# Patient Record
Sex: FEMALE | Race: WHITE | NOT HISPANIC OR LATINO | Employment: OTHER | URBAN - METROPOLITAN AREA
[De-identification: names, ages, dates, MRNs, and addresses within clinical notes are randomized per-mention and may not be internally consistent; named-entity substitution may affect disease eponyms.]

---

## 2017-02-02 ENCOUNTER — OFFICE VISIT (OUTPATIENT)
Dept: GYNECOLOGIC ONCOLOGY | Facility: CLINIC | Age: 67
End: 2017-02-02

## 2017-02-02 VITALS
RESPIRATION RATE: 14 BRPM | OXYGEN SATURATION: 97 % | WEIGHT: 172 LBS | TEMPERATURE: 97.4 F | HEART RATE: 66 BPM | BODY MASS INDEX: 28.62 KG/M2 | DIASTOLIC BLOOD PRESSURE: 71 MMHG | SYSTOLIC BLOOD PRESSURE: 162 MMHG

## 2017-02-02 DIAGNOSIS — N90.4 LICHEN SCLEROSUS OF FEMALE GENITALIA: ICD-10-CM

## 2017-02-02 DIAGNOSIS — C51.9 VULVAR CANCER (HCC): Primary | ICD-10-CM

## 2017-02-02 PROCEDURE — 99213 OFFICE O/P EST LOW 20 MIN: CPT | Performed by: NURSE PRACTITIONER

## 2017-02-02 RX ORDER — CLOBETASOL PROPIONATE 0.5 MG/G
CREAM TOPICAL 2 TIMES DAILY
Qty: 60 G | Refills: 5 | Status: SHIPPED | OUTPATIENT
Start: 2017-02-02

## 2017-02-02 RX ORDER — ATORVASTATIN CALCIUM 20 MG/1
20 TABLET, FILM COATED ORAL NIGHTLY
COMMUNITY
Start: 2016-11-09

## 2017-02-02 RX ORDER — OMEPRAZOLE 20 MG/1
20 CAPSULE, DELAYED RELEASE ORAL DAILY
COMMUNITY
Start: 2016-11-09

## 2017-02-02 NOTE — PROGRESS NOTES
"GYN ONCOLOGY CANCER SURVEILLANCE FOLLOW-UP    Lee Ann Cullen  [id number]  1950    Chief Complaint: Follow-up (spot on right side of vagina)        History of present illness:  Lee Ann Cullen is a 66 y.o. year old female who is here today for ongoing surveillance of Vulvar Cancer, see Cancer History. She also has a history of lichen sclerosus, which is managed with clobetasol. She is in need of medication refills today. She is feeling generally well today, but does report a small \"spot\" to the right side at the opening of the vagina. She reports occasional irritation, but denies itching, bleeding, pigment changes, or growth of the area. It is only occasionally tender to the touch. Otherwise, she denies vaginal bleeding, pelvic pain, or changes in bowel or bladder function.         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 Stage IIIB grade 1 by final pathology, 7/10 lymph nodes positive.      5/16/2014 Imaging    PET/CT negative for distant metastases      6/2/2014 - 7/9/2014 Radiation    Pelvis received 45 Gy in 25 fractions with 6 MV photons utilizing IMRT treatment planning      6/2014 - 7/2014 Chemotherapy    Completed concurrent chemotherapy with Cisplatin for radiosensitivity      7/10/2014 - 7/16/2014 Radiation    Tumor bed was boosted with additional 5.4 Gy in 3 fractions with 6 MV photons, for a total of 50.4 Gy      8/21/2014 Imaging    Post-treatment CT scan negative for disease         Past Medical History   Diagnosis Date   • Bilateral cataracts    • Depression    • GERD (gastroesophageal reflux disease)    • History of basal cell carcinoma      nose   • HTN (hypertension)    • Hypothyroidism    • IC (interstitial cystitis)      managed by Dr. Funes   • Insomnia    • Lichen sclerosus " of female genitalia    • MRSA (methicillin resistant staph aureus) culture positive    • Seasonal allergies    • Thyroid disease    • Vulvar cancer        Past Surgical History   Procedure Laterality Date   • Appendectomy  1992   • Total knee arthroplasty Left 2010   • Cataract extraction Bilateral    • Total abdominal hysterectomy with salpingo oophorectomy     • Vulvectomy Left 04/2014     modified radical vulvectomy with left inguinofemoral LND   • Back surgery  03/2016   • Other surgical history       microendoscopic disectomy   • Cystoscopy bladder hydrodistension       with biopsies   • Laparoscopic cholecystectomy  05/2016       MEDICATIONS: The current medication list was reviewed and reconciled.     Allergies:  is allergic to augmentin [amoxicillin-pot clavulanate]; hydrocodone-acetaminophen; and phenergan [promethazine hcl].    Family History   Problem Relation Age of Onset   • Heart disease Mother    • Diabetes Mother    • Skin cancer Mother    • Heart disease Father      congestive heart failure   • Heart disease Other      5 brothers   • Breast cancer Maternal Aunt 50       Review of Systems   Constitutional: Negative for appetite change, chills, fatigue, fever and unexpected weight change.   Respiratory: Negative for cough, shortness of breath and wheezing.    Cardiovascular: Negative for chest pain, palpitations and leg swelling.   Gastrointestinal: Negative for abdominal distention, abdominal pain, blood in stool, constipation, diarrhea, nausea and vomiting.   Endocrine: Negative.    Genitourinary: Positive for genital sores. Negative for dyspareunia, dysuria, frequency, hematuria, pelvic pain, urgency, vaginal bleeding, vaginal discharge and vaginal pain.   Musculoskeletal: Negative for arthralgias, gait problem and joint swelling.   Neurological: Negative for dizziness, seizures, syncope, weakness, light-headedness, numbness and headaches.   Hematological: Negative for adenopathy.    Psychiatric/Behavioral: Negative.        Physical Exam  General Appearance:  alert, cooperative, no apparent distress and appears stated age   Neurologic/Psychiatric: A&O x 3, gait steady, appropriate affect   HEENT:  Normocephalic, without obvious abnormality, mucous membranes moist   Abdomen:   Soft, non-tender, non-distended and no organomegaly   Lymph nodes: No cervical, supraclavicular, inguinal or axillary adenopathy noted   Extremities: Normal, atraumatic; no clubbing, cyanosis, or edema    Pelvic: External Genitalia  changes consistent with radiaiton and extensive vulvectomy. Chronic erythema unchanged from previous exams, no evidence of lichen sclerosus today and no lesions noted  Vagina  is pale, atrophic.  and consistent with whole pelvic radiation. Single small fluid filled cyst noted to right introitus at area of patient's concern. Area is soft and without masses. Color is consistent with surrounding tissue.  Vaginal Cuff  Female Vaginal Cuff: smooth, intact and without visible lesions  Uterus  surgically absent and no palpable masses  Ovaries  surgically absent bilaterallly and without palpable masses or fullness  Parametria  smooth  Rectovaginal  Female rectovaginal: deferred     ECOG Performance Status: 0 - Asymptomatic    Procedure Note:  No notes on file      Assessment and Plan:  Lee Ann was seen today for follow-up.    Diagnoses and all orders for this visit:    Vulvar cancer    Lichen sclerosus of female genitalia  -     clobetasol (TEMOVATE) 0.05 % cream; Apply  topically 2 (Two) Times a Day.        Lee Ann and I discussed the spot she has noticed seems to be a very simple small fluid filled cyst. This does not appear to be concerning at this time, however, we will observe this area closely. If this area changes, persists, enlarges, bleeds, or any other new lesions arise, we will obtain a biopsy. She v/u and will call with any concerns.     Return in about 6 months (around 8/2/2017) for ongoing  cancer surveillance.      JOHNNY Rodriguez        Note: Speech recognition transcription software was used to dictate portions of this document.  An attempt at proofreading has been made though minor errors in transcription may still be present.  Please do not hesitate to call our office with any questions.

## 2017-08-07 ENCOUNTER — OFFICE VISIT (OUTPATIENT)
Dept: GYNECOLOGIC ONCOLOGY | Facility: CLINIC | Age: 67
End: 2017-08-07

## 2017-08-07 VITALS
HEIGHT: 65 IN | DIASTOLIC BLOOD PRESSURE: 68 MMHG | BODY MASS INDEX: 28.32 KG/M2 | RESPIRATION RATE: 18 BRPM | HEART RATE: 61 BPM | WEIGHT: 170 LBS | OXYGEN SATURATION: 96 % | SYSTOLIC BLOOD PRESSURE: 149 MMHG | TEMPERATURE: 97.5 F

## 2017-08-07 DIAGNOSIS — C51.9 VULVAR CANCER (HCC): Primary | ICD-10-CM

## 2017-08-07 DIAGNOSIS — L98.9 SKIN LESION OF LEFT LOWER LIMB: ICD-10-CM

## 2017-08-07 PROCEDURE — 99213 OFFICE O/P EST LOW 20 MIN: CPT | Performed by: NURSE PRACTITIONER

## 2017-08-07 NOTE — PROGRESS NOTES
GYN ONCOLOGY CANCER SURVEILLANCE FOLLOW-UP    Lee Ann Cullen  [id number]  1950    Chief Complaint: Follow-up (vulvar cancer)        History of present illness:  Lee Ann Cullen is a 66 y.o. year old female who is here today for ongoing surveillance of Vulvar Cancer, see Cancer History. She is continuing to manage her lichen sclerosus with clobetasol without any problems.  She states she is feeling generally well today and denies any new vulvar lesions.  Her only concern today is of a new small skin lesion to upper left thigh.  She does not currently have a dermatologist and does not desire to return to her previous dermatologist in [place].  She states this spot has been there for a few weeks, is red but not bleeding and appears to be a cluster of vessels at the surface.  This spot and does not itch and has not grown in size since it appeared.  She has been applying Aquaphor topically with no change.  Otherwise, she denies vaginal bleeding, pelvic pain, or changes in bowel or bladder function.     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 Stage IIIB grade 1 by final pathology, 7/10 lymph nodes positive.         5/16/2014 Imaging     PET/CT negative for distant metastases         6/2/2014 - 7/9/2014 Radiation     Pelvis received 45 Gy in 25 fractions with 6 MV photons utilizing IMRT treatment planning         6/2014 - 7/2014 Chemotherapy     Completed concurrent chemotherapy with Cisplatin for radiosensitivity         7/10/2014 - 7/16/2014 Radiation     Tumor bed was boosted with additional 5.4 Gy in 3 fractions with 6 MV photons, for a total of 50.4 Gy         8/21/2014 Imaging     Post-treatment CT scan negative for disease            Past Medical History:   Diagnosis Date   • Bilateral  cataracts    • Depression    • GERD (gastroesophageal reflux disease)    • History of basal cell carcinoma     nose   • HTN (hypertension)    • Hypothyroidism    • IC (interstitial cystitis)     managed by Dr. Funes   • Insomnia    • Lichen sclerosus of female genitalia    • MRSA (methicillin resistant staph aureus) culture positive    • Seasonal allergies    • Thyroid disease    • Vulvar cancer        Past Surgical History:   Procedure Laterality Date   • APPENDECTOMY  1992   • BACK SURGERY  03/2016   • CATARACT EXTRACTION Bilateral    • CYSTOSCOPY BLADDER HYDRODISTENSION      with biopsies   • LAPAROSCOPIC CHOLECYSTECTOMY  05/2016   • OTHER SURGICAL HISTORY      microendoscopic disectomy   • TOTAL ABDOMINAL HYSTERECTOMY WITH SALPINGO OOPHORECTOMY     • TOTAL KNEE ARTHROPLASTY Left 2010   • VULVECTOMY Left 04/2014    modified radical vulvectomy with left inguinofemoral LND       MEDICATIONS: The current medication list was reviewed and reconciled.     Allergies:  is allergic to phenergan [promethazine hcl]; augmentin [amoxicillin-pot clavulanate]; and hydrocodone-acetaminophen.    Family History   Problem Relation Age of Onset   • Heart disease Mother    • Diabetes Mother    • Skin cancer Mother    • Heart disease Father      congestive heart failure   • Heart disease Other      5 brothers   • Breast cancer Maternal Aunt 50       Review of Systems   Constitutional: Negative for appetite change, chills, fatigue, fever and unexpected weight change.   Respiratory: Negative for cough, shortness of breath and wheezing.    Cardiovascular: Negative for chest pain, palpitations and leg swelling.   Gastrointestinal: Negative for abdominal distention, abdominal pain, blood in stool, constipation, diarrhea, nausea and vomiting.   Endocrine: Negative.    Genitourinary: Negative for dyspareunia, dysuria, frequency, genital sores, hematuria, pelvic pain, urgency, vaginal bleeding, vaginal discharge and vaginal pain.  "  Musculoskeletal: Negative for arthralgias, gait problem and joint swelling.   Skin: Positive for color change (red lesion to upper right thigh).   Neurological: Negative for dizziness, seizures, syncope, weakness, light-headedness, numbness and headaches.   Hematological: Negative for adenopathy.   Psychiatric/Behavioral: Negative.        Physical Exam  Vital Signs: /68  Pulse 61  Temp 97.5 °F (36.4 °C)  Resp 18  Ht 65\" (165.1 cm)  Wt 170 lb (77.1 kg)  SpO2 96%  BMI 28.29 kg/m2   General Appearance:  alert, cooperative, no apparent distress and appears stated age   Neurologic/Psychiatric: A&O x 3, gait steady, appropriate affect   HEENT:  Normocephalic, without obvious abnormality, mucous membranes moist   Abdomen:   Soft, non-tender, non-distended and no organomegaly   Lymph nodes: No cervical, supraclavicular, inguinal or axillary adenopathy noted   Extremities: Normal, atraumatic; no clubbing, cyanosis, or edema. Small reddened lesion to upper LLE, appears vascular and not dysplastic in nature    Pelvic: External Genitalia  Changes consistent with radiation and extensive vulvectomy.  Chronic erythema unchanged from previous exams, no evidence of active lichen sclerosus or new concerning lesions.  Vagina  is pale, atrophic.  and changes consistent with previous radiation.  Vaginal Cuff  Female Vaginal Cuff: smooth, intact and without visible lesions. Pap obtained.   Uterus  surgically absent and no palpable masses  Ovaries  surgically absent bilaterallly  Parametria  smooth  Rectovaginal  Female rectovaginal: confirms no masses or bleeding and Hemoccult negative     ECOG Performance Status: 0 - Asymptomatic    Procedure Note:  No notes on file      Assessment and Plan:  Lee Ann was seen today for follow-up.    Diagnoses and all orders for this visit:    Vulvar cancer  -     Pap IG, Rfx HPV ASCU; Future    Skin lesion of left lower limb        Lee Ann and I discussed there is no evidence of disease upon " vulvar or pelvic exam today.  The lesion to her upper left thigh is most consistent with telangiectasia and does not appear obviously concerning. However, if lesion persists, grows, or changes in color or nature, I am happy to perform a biopsy in office for her or refer her to a new dermatologist for evaluation. She v/u and will notify me of any changes or if lesion persists.   Otherwise, we will continue her regular schedule for ongoing cancer surveillance every 6 months.     Return in about 6 months (around 2/7/2018) for ongoing cancer surveillance.      Desiree Mcallister, JOHNNY        Note: Speech recognition transcription software was used to dictate portions of this document.  An attempt at proofreading has been made though minor errors in transcription may still be present.  Please do not hesitate to call our office with any questions.

## 2018-02-08 ENCOUNTER — OFFICE VISIT (OUTPATIENT)
Dept: GYNECOLOGIC ONCOLOGY | Facility: CLINIC | Age: 68
End: 2018-02-08

## 2018-02-08 VITALS
DIASTOLIC BLOOD PRESSURE: 74 MMHG | SYSTOLIC BLOOD PRESSURE: 164 MMHG | WEIGHT: 168 LBS | BODY MASS INDEX: 27.96 KG/M2 | OXYGEN SATURATION: 97 % | RESPIRATION RATE: 14 BRPM | HEART RATE: 74 BPM | TEMPERATURE: 97.2 F

## 2018-02-08 DIAGNOSIS — C51.9 VULVAR CANCER (HCC): Primary | ICD-10-CM

## 2018-02-08 PROCEDURE — 99213 OFFICE O/P EST LOW 20 MIN: CPT | Performed by: NURSE PRACTITIONER

## 2018-02-08 NOTE — PROGRESS NOTES
GYN ONCOLOGY CANCER SURVEILLANCE FOLLOW-UP    Lee Ann Cullen  [id number]  1950    Chief Complaint: Follow-up (no complaints)        History of present illness:  Lee Ann Cullen is a 67 y.o. year old female who is here today for ongoing surveillance of Vulvar Cancer, see Cancer History. She reports she is feeling very well today and has no complaints. She denies vaginal bleeding, pelvic pain, new lesions, and changes in bowel or bladder function. Her last pap smear was in 11/2017 performed by Dr. Jha. She is continuing to see him and us both every 3 months in alternating fashion. She will be approaching 4 years from completion of treatment later this year.  She is scheduled to see Dr. Jha again in 5/2018.           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 Stage IIIB grade 1 by final pathology, 7/10 lymph nodes positive.         5/16/2014 Imaging     PET/CT negative for distant metastases         6/2/2014 - 7/9/2014 Radiation     Pelvis received 45 Gy in 25 fractions with 6 MV photons utilizing IMRT treatment planning         6/2014 - 7/2014 Chemotherapy     Completed concurrent chemotherapy with Cisplatin for radiosensitivity         7/10/2014 - 7/16/2014 Radiation     Tumor bed was boosted with additional 5.4 Gy in 3 fractions with 6 MV photons, for a total of 50.4 Gy         8/21/2014 Imaging     Post-treatment CT scan negative for disease            Past Medical History:   Diagnosis Date   • Bilateral cataracts    • Depression    • GERD (gastroesophageal reflux disease)    • History of basal cell carcinoma     nose   • HTN (hypertension)    • Hypothyroidism    • IC (interstitial cystitis)     managed by Dr. Funes   • Insomnia    • Lichen sclerosus of female genitalia    • MRSA  (methicillin resistant staph aureus) culture positive    • Seasonal allergies    • Thyroid disease    • Vulvar cancer        Past Surgical History:   Procedure Laterality Date   • APPENDECTOMY  1992   • BACK SURGERY  03/2016   • CATARACT EXTRACTION Bilateral    • CYSTOSCOPY BLADDER HYDRODISTENSION      with biopsies   • LAPAROSCOPIC CHOLECYSTECTOMY  05/2016   • OTHER SURGICAL HISTORY      microendoscopic disectomy   • TOTAL ABDOMINAL HYSTERECTOMY WITH SALPINGO OOPHORECTOMY     • TOTAL KNEE ARTHROPLASTY Left 2010   • VULVECTOMY Left 04/2014    modified radical vulvectomy with left inguinofemoral LND       MEDICATIONS: The current medication list was reviewed and reconciled.     Allergies:  is allergic to phenergan [promethazine hcl]; augmentin [amoxicillin-pot clavulanate]; and hydrocodone-acetaminophen.    Family History   Problem Relation Age of Onset   • Heart disease Mother    • Diabetes Mother    • Skin cancer Mother    • Heart disease Father      congestive heart failure   • Heart disease Other      5 brothers   • Breast cancer Maternal Aunt 50       Review of Systems   Constitutional: Negative for appetite change, chills, fatigue, fever and unexpected weight change.   Respiratory: Negative for cough, shortness of breath and wheezing.    Cardiovascular: Negative for chest pain, palpitations and leg swelling.   Gastrointestinal: Negative for abdominal distention, abdominal pain, blood in stool, constipation, diarrhea, nausea and vomiting.   Endocrine: Negative.    Genitourinary: Negative for dyspareunia, dysuria, frequency, genital sores, hematuria, pelvic pain, urgency, vaginal bleeding, vaginal discharge and vaginal pain.   Musculoskeletal: Negative for arthralgias, gait problem and joint swelling.   Neurological: Negative for dizziness, seizures, syncope, weakness, light-headedness, numbness and headaches.   Hematological: Negative for adenopathy.   Psychiatric/Behavioral: Negative.        Physical  Exam  Vital Signs: /74  Pulse 74  Temp 97.2 °F (36.2 °C) (Temporal Artery )   Resp 14  Wt 76.2 kg (168 lb)  SpO2 97%  BMI 27.96 kg/m2   General Appearance:  alert, cooperative, no apparent distress and appears stated age   Neurologic/Psychiatric: A&O x 3, gait steady, appropriate affect   HEENT:  Normocephalic, without obvious abnormality, mucous membranes moist   Neck: Supple, symmetrical, trachea midline, no adenopathy;  No thyromegaly, masses, or tenderness   Back:   Symmetric, no curvature, ROM normal, no CVA tenderness   Lungs:   Clear to auscultation bilaterally; respirations regular, even, and unlabored bilaterally   Heart:  Regular rate and rhythm, no murmurs appreciated   Breasts:  deferred   Abdomen:   Soft, non-tender, non-distended and no organomegaly   Lymph nodes: No cervical, supraclavicular, inguinal or axillary adenopathy noted   Extremities: Normal, atraumatic; no clubbing, cyanosis, or edema    Pelvic: External Genitalia  Changes consistent with radiation and extensive vulvectomy. Chronic erythema unchanged. Lichen sclerosus well controlled, no new lesions.   Vagina  is pale, atrophic.  and changes consistent with previous radiation.  Vaginal Cuff  Female Vaginal Cuff: smooth, intact and without visible lesions  Uterus  surgically absent and no palpable masses  Ovaries  surgically absent bilaterallly  Parametria  smooth  Rectovaginal  Female rectovaginal: deferred     ECOG Performance Status: 0 - Asymptomatic    Procedure Note:  No notes on file      Assessment and Plan:  Lee Ann was seen today for follow-up.    Diagnoses and all orders for this visit:    Vulvar cancer        There is no evidence of disease upon today's exam. She will be approaching 4 years from completion of treatment later this year and is doing very well. She is encouraged to keep her well woman exams with Dr. Jha and may now go to every 6 month visits, continuing to alternate between our 2 offices. She is  understanding to call with any changes in pelvic symptoms or general GYN concerns.       Return in about 9 months (around 11/8/2018).      JOHNNY Rodriguez        Note: Speech recognition transcription software was used to dictate portions of this document.  An attempt at proofreading has been made though minor errors in transcription may still be present.  Please do not hesitate to call our office with any questions.

## 2018-09-06 ENCOUNTER — TELEPHONE (OUTPATIENT)
Dept: GYNECOLOGIC ONCOLOGY | Facility: CLINIC | Age: 68
End: 2018-09-06

## 2018-09-06 DIAGNOSIS — Z12.11 SCREENING FOR COLON CANCER: Primary | ICD-10-CM

## 2018-09-06 NOTE — TELEPHONE ENCOUNTER
----- Message from Doreen ROSA ISELA Myers sent at 9/6/2018 11:25 AM EDT -----  Regarding: [facility] REFERRAL FOR A COLONOSCOPY  Contact: [phone]  Patient would like a referral for a colonoscopy and wants it scheduled here in [place] would like it to be 9/19/18, 9/25/18, 9/28/19 if possible early morning.  Order in for pt to be referred to Dr. Oglesby for colonoscopy for screening, phoned pt, informed her someone would be calling to schedule, pt v/u.

## 2018-10-02 DIAGNOSIS — Z12.11 SCREENING FOR COLON CANCER: Primary | ICD-10-CM

## 2018-11-08 ENCOUNTER — OFFICE VISIT (OUTPATIENT)
Dept: GYNECOLOGIC ONCOLOGY | Facility: CLINIC | Age: 68
End: 2018-11-08

## 2018-11-08 VITALS
OXYGEN SATURATION: 94 % | WEIGHT: 157 LBS | RESPIRATION RATE: 12 BRPM | HEART RATE: 73 BPM | TEMPERATURE: 98 F | SYSTOLIC BLOOD PRESSURE: 152 MMHG | BODY MASS INDEX: 26.13 KG/M2 | DIASTOLIC BLOOD PRESSURE: 70 MMHG

## 2018-11-08 DIAGNOSIS — C51.9 VULVAR CANCER (HCC): Primary | ICD-10-CM

## 2018-11-08 DIAGNOSIS — L81.9 DISCOLORATION OF SKIN: ICD-10-CM

## 2018-11-08 DIAGNOSIS — K59.00 CONSTIPATION, UNSPECIFIED CONSTIPATION TYPE: ICD-10-CM

## 2018-11-08 PROCEDURE — 99213 OFFICE O/P EST LOW 20 MIN: CPT | Performed by: NURSE PRACTITIONER

## 2018-11-08 RX ORDER — TRIAMCINOLONE ACETONIDE 1 MG/G
CREAM TOPICAL
COMMUNITY
Start: 2018-10-18 | End: 2019-01-10

## 2018-11-08 RX ORDER — NYSTATIN 100000 U/G
CREAM TOPICAL
COMMUNITY
Start: 2018-10-18 | End: 2019-01-10

## 2018-11-08 NOTE — PROGRESS NOTES
GYN ONCOLOGY CANCER SURVEILLANCE FOLLOW-UP    Lee Ann Cullen  [id number]  1950    Chief Complaint: Follow-up (discoloration in the groin)        History of present illness:  Lee Ann Cullen is a 68 y.o. year old female who is here today for ongoing surveillance of Vulvar Cancer, see Cancer History. She has been alternating her visits between our office and Dr. Funes, scheduled for annual exam there in December. Upon arrival today she reports discoloration, similar to bruising, throughout the external genitalia and bilateral inner thighs, worse to the left side. This started approx 1 month ago following a UTI and acute severe constipation. There has been no pain or external bleeding. It is improving every day and is almost resolved.   Otherwise, she is feeling generally well today. She denies vaginal bleeding, pelvic pain, concerning lesions. She reports her previous UTI was treated and resolved. She is staying on a daily bowel regimen and constipation is improved. She is scheduled for colonoscopy in December.         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 Stage IIIB grade 1 by final pathology, 7/10 lymph nodes positive.         5/16/2014 Imaging     PET/CT negative for distant metastases         6/2/2014 - 7/9/2014 Radiation     Pelvis received 45 Gy in 25 fractions with 6 MV photons utilizing IMRT treatment planning         6/2014 - 7/2014 Chemotherapy     Completed concurrent chemotherapy with Cisplatin for radiosensitivity         7/10/2014 - 7/16/2014 Radiation     Tumor bed was boosted with additional 5.4 Gy in 3 fractions with 6 MV photons, for a total of 50.4 Gy         8/21/2014 Imaging     Post-treatment CT scan negative for disease            Past Medical History:    Diagnosis Date   • Bilateral cataracts    • Depression    • GERD (gastroesophageal reflux disease)    • History of basal cell carcinoma     nose   • HTN (hypertension)    • Hypothyroidism    • IC (interstitial cystitis)     managed by Dr. Funes   • Insomnia    • Lichen sclerosus of female genitalia    • MRSA (methicillin resistant staph aureus) culture positive    • Seasonal allergies    • Thyroid disease    • Vulvar cancer (CMS/HCC)        Past Surgical History:   Procedure Laterality Date   • APPENDECTOMY  1992   • BACK SURGERY  03/2016   • CATARACT EXTRACTION Bilateral    • CYSTOSCOPY BLADDER HYDRODISTENSION      with biopsies   • LAPAROSCOPIC CHOLECYSTECTOMY  05/2016   • OTHER SURGICAL HISTORY      microendoscopic disectomy   • TOTAL ABDOMINAL HYSTERECTOMY WITH SALPINGO OOPHORECTOMY     • TOTAL KNEE ARTHROPLASTY Left 2010   • VULVECTOMY Left 04/2014    modified radical vulvectomy with left inguinofemoral LND       MEDICATIONS: The current medication list was reviewed and reconciled.     Allergies:  is allergic to phenergan [promethazine hcl]; augmentin [amoxicillin-pot clavulanate]; and hydrocodone-acetaminophen.    Family History   Problem Relation Age of Onset   • Heart disease Mother    • Diabetes Mother    • Skin cancer Mother    • Heart disease Father         congestive heart failure   • Heart disease Other         5 brothers   • Breast cancer Maternal Aunt 50       Review of Systems   Constitutional: Negative for appetite change, chills, fatigue, fever and unexpected weight change.   Respiratory: Negative for cough, shortness of breath and wheezing.    Cardiovascular: Negative for chest pain, palpitations and leg swelling.   Gastrointestinal: Negative for abdominal distention, abdominal pain, blood in stool, constipation, diarrhea, nausea and vomiting.   Endocrine: Negative.    Genitourinary: Negative for dyspareunia, dysuria, frequency, genital sores, hematuria, pelvic pain, urgency, vaginal bleeding,  vaginal discharge and vaginal pain.   Musculoskeletal: Negative for arthralgias, gait problem and joint swelling.   Skin: Positive for color change (groin).   Neurological: Negative for dizziness, seizures, syncope, weakness, light-headedness, numbness and headaches.   Hematological: Negative for adenopathy.   Psychiatric/Behavioral: Negative.        Physical Exam  Vital Signs: /70   Pulse 73   Temp 98 °F (36.7 °C) (Temporal Artery )   Resp 12   Wt 71.2 kg (157 lb)   SpO2 94%   BMI 26.13 kg/m²    General Appearance:  alert, cooperative, no apparent distress and appears stated age   Neurologic/Psychiatric: A&O x 3, gait steady, appropriate affect   HEENT:  Normocephalic, without obvious abnormality, mucous membranes moist   Neck: Supple, symmetrical, trachea midline, no adenopathy;  No thyromegaly, masses, or tenderness   Back:   Symmetric, no curvature, ROM normal, no CVA tenderness   Lungs:   Clear to auscultation bilaterally; respirations regular, even, and unlabored bilaterally   Heart:  Regular rate and rhythm, no murmurs appreciated   Breasts:  deferred   Abdomen:   Soft, non-tender, non-distended and no organomegaly   Lymph nodes: No cervical, supraclavicular, inguinal adenopathy noted   Extremities: Normal, atraumatic; no clubbing, cyanosis, or edema    Pelvic: External Genitalia  changes consistent with previous radiation and extensive vulvectomy. Lichen sclerosus well controlled. General resolving brusing throughout. No new lesions  Vagina  is pale, atrophic.  and changes consistent with previous radiation.  Vaginal Cuff  Female Vaginal Cuff: smooth, intact and without visible lesions  Uterus  surgically absent and no palpable masses  Ovaries  surgically absent bilaterallly  Parametria  smooth  Rectovaginal  Female rectovaginal: deferred     ECOG Performance Status: 0 - Asymptomatic    Procedure Note:  No notes on file      Assessment and Plan:  Lee Ann was seen today for follow-up.    Diagnoses  and all orders for this visit:    Vulvar cancer (CMS/HCC)    Discoloration of skin    Constipation, unspecified constipation type        There is no evidence of disease upon today's exam. Continue alternating visits between our office and Dr. Funes. She is scheduled for annual there in 12/2018. Return here in 6 months. She is understanding to call with any changes in pelvic symptoms or general GYN concerns at any time between regularly scheduled visits.     Encouraged to continue good home bowel regimen to prevent constipation. Skin discoloration appears to be similar to bruising that would happen post-op from seroma or otherwise. This likely occurred when she was bearing down against constipation. Area is without lesions and is resolving. Encouraged patient to monitor this and notify us if it recurs. She v/u.       Return to clinic in 6 months for ongoing cancer surveillance.      Desiree Mcallister, JOHNNY        Note: Speech recognition transcription software was used to dictate portions of this document.  An attempt at proofreading has been made though minor errors in transcription may still be present.  Please do not hesitate to call our office with any questions.

## 2018-12-10 DIAGNOSIS — Z12.11 SCREENING FOR COLON CANCER: ICD-10-CM

## 2018-12-12 ENCOUNTER — OUTSIDE FACILITY SERVICE (OUTPATIENT)
Dept: GASTROENTEROLOGY | Facility: CLINIC | Age: 68
End: 2018-12-12

## 2018-12-12 ENCOUNTER — LAB REQUISITION (OUTPATIENT)
Dept: LAB | Facility: HOSPITAL | Age: 68
End: 2018-12-12

## 2018-12-12 DIAGNOSIS — Z12.11 ENCOUNTER FOR SCREENING FOR MALIGNANT NEOPLASM OF COLON: ICD-10-CM

## 2018-12-12 PROCEDURE — 88305 TISSUE EXAM BY PATHOLOGIST: CPT | Performed by: INTERNAL MEDICINE

## 2018-12-12 PROCEDURE — G0121 COLON CA SCRN NOT HI RSK IND: HCPCS | Performed by: INTERNAL MEDICINE

## 2018-12-13 LAB
CYTO UR: NORMAL
LAB AP CASE REPORT: NORMAL
LAB AP CLINICAL INFORMATION: NORMAL
PATH REPORT.FINAL DX SPEC: NORMAL
PATH REPORT.GROSS SPEC: NORMAL

## 2019-01-10 ENCOUNTER — OFFICE VISIT (OUTPATIENT)
Dept: GYNECOLOGIC ONCOLOGY | Facility: CLINIC | Age: 69
End: 2019-01-10

## 2019-01-10 ENCOUNTER — LAB (OUTPATIENT)
Dept: LAB | Facility: HOSPITAL | Age: 69
End: 2019-01-10

## 2019-01-10 VITALS
SYSTOLIC BLOOD PRESSURE: 158 MMHG | RESPIRATION RATE: 12 BRPM | BODY MASS INDEX: 26.63 KG/M2 | TEMPERATURE: 97.9 F | HEART RATE: 71 BPM | WEIGHT: 160 LBS | DIASTOLIC BLOOD PRESSURE: 88 MMHG

## 2019-01-10 DIAGNOSIS — R30.0 DYSURIA: ICD-10-CM

## 2019-01-10 DIAGNOSIS — C51.9 VULVAR CANCER (HCC): ICD-10-CM

## 2019-01-10 DIAGNOSIS — N90.89 VULVAR LESION: Primary | ICD-10-CM

## 2019-01-10 LAB
BACTERIA UR QL AUTO: NORMAL /HPF
BILIRUB UR QL STRIP: NEGATIVE
CLARITY UR: CLEAR
COLOR UR: YELLOW
GLUCOSE UR STRIP-MCNC: NEGATIVE MG/DL
HGB UR QL STRIP.AUTO: ABNORMAL
HYALINE CASTS UR QL AUTO: NORMAL /LPF
KETONES UR QL STRIP: NEGATIVE
LEUKOCYTE ESTERASE UR QL STRIP.AUTO: ABNORMAL
NITRITE UR QL STRIP: NEGATIVE
PH UR STRIP.AUTO: 8.5 [PH] (ref 5–8)
PROT UR QL STRIP: NEGATIVE
RBC # UR: NORMAL /HPF
REF LAB TEST METHOD: NORMAL
SP GR UR STRIP: 1.02 (ref 1–1.03)
SQUAMOUS #/AREA URNS HPF: NORMAL /HPF
UROBILINOGEN UR QL STRIP: ABNORMAL
WBC UR QL AUTO: NORMAL /HPF

## 2019-01-10 PROCEDURE — 56605 BIOPSY OF VULVA/PERINEUM: CPT | Performed by: NURSE PRACTITIONER

## 2019-01-10 PROCEDURE — 99214 OFFICE O/P EST MOD 30 MIN: CPT | Performed by: NURSE PRACTITIONER

## 2019-01-10 PROCEDURE — 88341 IMHCHEM/IMCYTCHM EA ADD ANTB: CPT | Performed by: NURSE PRACTITIONER

## 2019-01-10 PROCEDURE — 81001 URINALYSIS AUTO W/SCOPE: CPT

## 2019-01-10 PROCEDURE — 88342 IMHCHEM/IMCYTCHM 1ST ANTB: CPT | Performed by: NURSE PRACTITIONER

## 2019-01-10 PROCEDURE — 87086 URINE CULTURE/COLONY COUNT: CPT

## 2019-01-10 PROCEDURE — 88305 TISSUE EXAM BY PATHOLOGIST: CPT | Performed by: NURSE PRACTITIONER

## 2019-01-10 NOTE — PROGRESS NOTES
GYN ONCOLOGY FOLLOW-UP    Lee Ann Cullen  [id number]  1950    Chief Complaint: Follow-up (possible uti, new place on the vulva)        History of present illness:  Lee Ann Cullen is a 68 y.o. year old female who is here today for complaint of new lesion and possible UTI. She has a history of vulvar cancer as outlined below, no evidence of recurrence since completion of treatment in 7/2014. She recently noticed a new, painful lesion to the right mid vulva. Area itches, burns, and has bled. This was not present at her follow-up 2 months ago, nor at her annual with Dr. Funes last month. She also describes urinary urgency with leaking and mild dysuria. Denies fevers, flank pain, or andrey hematuria. She requests evaluation of the new lesion and a urine test today.     Oncology History:       Vulvar cancer (CMS/HCC)    3/19/2014 Initial Diagnosis     Moderately differentiated squamous cell carcinoma identified upon biopsies of vulvar lesion and labia minora         4/28/2014 Surgery     Modified radical left vulvectomy and left inguinofemoral LND. Stage IIIB grade 1 by final pathology, 7/10 lymph nodes positive.         5/16/2014 Imaging     PET/CT negative for distant metastases         6/2/2014 - 7/9/2014 Radiation     Pelvis received 45 Gy in 25 fractions with 6 MV photons utilizing IMRT treatment planning         6/2014 - 7/2014 Chemotherapy     Completed concurrent chemotherapy with Cisplatin for radiosensitivity         7/10/2014 - 7/16/2014 Radiation     Tumor bed was boosted with additional 5.4 Gy in 3 fractions with 6 MV photons, for a total of 50.4 Gy         8/21/2014 Imaging     Post-treatment CT scan negative for disease            Past Medical History:   Diagnosis Date   • Bilateral cataracts    • Depression    • GERD (gastroesophageal reflux disease)    • History of basal cell carcinoma     nose   • HTN (hypertension)    • Hypothyroidism    • IC (interstitial cystitis)     managed by Dr. Funes   •  Insomnia    • Lichen sclerosus of female genitalia    • MRSA (methicillin resistant staph aureus) culture positive    • Seasonal allergies    • Thyroid disease    • Vulvar cancer (CMS/HCC)        Past Surgical History:   Procedure Laterality Date   • APPENDECTOMY  1992   • BACK SURGERY  03/2016   • CATARACT EXTRACTION Bilateral    • CYSTOSCOPY BLADDER HYDRODISTENSION      with biopsies   • LAPAROSCOPIC CHOLECYSTECTOMY  05/2016   • OTHER SURGICAL HISTORY      microendoscopic disectomy   • TOTAL ABDOMINAL HYSTERECTOMY WITH SALPINGO OOPHORECTOMY     • TOTAL KNEE ARTHROPLASTY Left 2010   • VULVECTOMY Left 04/2014    modified radical vulvectomy with left inguinofemoral LND       MEDICATIONS: The current medication list was reviewed and reconciled.     Allergies:  is allergic to phenergan [promethazine hcl]; augmentin [amoxicillin-pot clavulanate]; and hydrocodone-acetaminophen.    Family History   Problem Relation Age of Onset   • Heart disease Mother    • Diabetes Mother    • Skin cancer Mother    • Heart disease Father         congestive heart failure   • Heart disease Other         5 brothers   • Breast cancer Maternal Aunt 50         Last imaging study was post-treatment CT 8/21/2014.     Review of Systems   Constitutional: Negative for appetite change, chills, fatigue, fever and unexpected weight change.   Respiratory: Negative for cough, shortness of breath and wheezing.    Cardiovascular: Negative for chest pain, palpitations and leg swelling.   Gastrointestinal: Negative for abdominal distention, abdominal pain, blood in stool, constipation, diarrhea, nausea and vomiting.   Endocrine: Negative.    Genitourinary: Positive for dysuria, genital sores and urgency (with leaking). Negative for dyspareunia, frequency, hematuria, pelvic pain, vaginal bleeding, vaginal discharge and vaginal pain.   Musculoskeletal: Negative for arthralgias, gait problem and joint swelling.   Neurological: Negative for dizziness, seizures,  syncope, weakness, light-headedness, numbness and headaches.   Hematological: Negative for adenopathy.   Psychiatric/Behavioral: Negative.        Physical Exam  Vital Signs: /88   Pulse 71   Temp 97.9 °F (36.6 °C) (Temporal)   Resp 12   Wt 72.6 kg (160 lb)   BMI 26.63 kg/m²    General Appearance:  alert, cooperative, no apparent distress and appears stated age   Neurologic/Psychiatric: A&O x 3, gait steady, appropriate affect   HEENT:  Normocephalic, without obvious abnormality, mucous membranes moist   Back:   Symmetric, no curvature, ROM normal, no CVA tenderness   Breasts:  deferred   Abdomen:   Soft, non-tender, non-distended and no organomegaly   Lymph nodes: No inguinal adenopathy noted   Extremities: Normal, atraumatic; no clubbing, cyanosis, or edema    Pelvic: External Genitalia  without lesions or skin changes, changes consistent with previous radiaiton and extensive vulvectomy. Small 2 cm x 0.5 cm ulcerated lesion noted to right mid vulva. No firm mass or other lesions noted  Vagina  is pale, atrophic.  and changes consistent with previous radiation.  Vaginal Cuff  Female Vaginal Cuff: smooth, intact and without visible lesions  Uterus  surgically absent  Ovaries  surgically absent bilaterallly  Parametria  smooth  Rectovaginal  Female rectovaginal: deferred     ECOG Performance Status: 0 - Asymptomatic    Procedure Notes:  After discussion of procedure and obtaining consent a vulvar punch biopsy was performed. Lesion located upon middle right vulva, approx 2 x 0.5 cm in size. Site cleaned with betadine in the usual fashion. 3 mL lidocaine with epinephrine injected beneath lesion for local anesthesia. Punch biopsy performed and tissue sample cut away from underlying tissue. Specimen placed into appropriate container and labeled at bedside. Bleeding was Minimal. Silver nitrate used to achieve hemostasis. Patient tolerated the procedure well.       Assessment and Plan:    Lee Ann was seen today for  follow-up.    Diagnoses and all orders for this visit:    Vulvar lesion  -     Tissue Pathology Exam    Dysuria  -     Urinalysis With Microscopic If Indicated (No Culture) - Urine, Clean Catch; Future  -     Urine Culture - Urine, Urine, Clean Catch; Future    Vulvar cancer (CMS/[facility])        Punch biopsy of new lesion obtained in office today. This is an area of small ulceration, but given her history, we agreed to proceed with biopsy. No other evidence of disease noted throughout remainder of pelvic exam. She will be notified of results upon the return of pathology and we will discuss POC at that time. If benign, will plan for barrier treatments of ulceration and continue routine surveillance.    Post-procedure home precautions reviewed.     UA and culture ordered due to pt c/o urgency and dysuria. She will be notified of results and any abnormalities will be treated accordingly.       Return to clinic to be determined upon pathology results.       JOHNNY Rodriguez      Note: Speech recognition transcription software was used to dictate portions of this document.  An attempt at proofreading has been made though minor errors in transcription may still be present.  Please do not hesitate to call our office with any questions.

## 2019-01-12 LAB — BACTERIA SPEC AEROBE CULT: NORMAL

## 2019-01-14 ENCOUNTER — TELEPHONE (OUTPATIENT)
Dept: GYNECOLOGIC ONCOLOGY | Facility: CLINIC | Age: 69
End: 2019-01-14

## 2019-01-14 NOTE — TELEPHONE ENCOUNTER
----- Message from Doreen Myers sent at 1/14/2019 10:02 AM EST -----  Regarding: KRIS-BIOPSY RESULTS  Contact: [phone]  Patient called and wants to get her biopsy results. Please call.

## 2019-01-14 NOTE — TELEPHONE ENCOUNTER
Called patient and let her know that biopsy results are not back. She v/u. Also let her know that UA showed blood but no evidence of infection. She also v/u about that. She knows we will call once we get biopsy results.

## 2019-01-16 ENCOUNTER — TELEPHONE (OUTPATIENT)
Dept: GYNECOLOGIC ONCOLOGY | Facility: CLINIC | Age: 69
End: 2019-01-16

## 2019-01-16 LAB
CYTO UR: NORMAL
LAB AP CASE REPORT: NORMAL
LAB AP CLINICAL INFORMATION: NORMAL
LAB AP DIAGNOSIS COMMENT: NORMAL
PATH REPORT.FINAL DX SPEC: NORMAL
PATH REPORT.GROSS SPEC: NORMAL

## 2019-01-16 NOTE — TELEPHONE ENCOUNTER
Called patient to notify vulvar biopsy benign, consistent with ulceration.   She is very pleased to hear this, but does note that she is having delayed healing at the biopsy site. She was encouraged to apply neosporin 2-3 times per day to the site and to call Friday or Monday with an update. Once healed sufficiently, we may consider addition of topical Premarin cream to prevent recurrent ulceration in thin/radiated vulvar tissues. She v/u.

## 2019-05-08 ENCOUNTER — OFFICE VISIT (OUTPATIENT)
Dept: GYNECOLOGIC ONCOLOGY | Facility: CLINIC | Age: 69
End: 2019-05-08

## 2019-05-08 VITALS
BODY MASS INDEX: 26.96 KG/M2 | SYSTOLIC BLOOD PRESSURE: 145 MMHG | HEART RATE: 54 BPM | RESPIRATION RATE: 14 BRPM | WEIGHT: 162 LBS | OXYGEN SATURATION: 96 % | DIASTOLIC BLOOD PRESSURE: 65 MMHG

## 2019-05-08 DIAGNOSIS — C51.9 VULVAR CANCER (HCC): Primary | ICD-10-CM

## 2019-05-08 DIAGNOSIS — N90.4 LICHEN SCLEROSUS OF FEMALE GENITALIA: ICD-10-CM

## 2019-05-08 PROCEDURE — 99213 OFFICE O/P EST LOW 20 MIN: CPT | Performed by: NURSE PRACTITIONER

## 2019-05-08 NOTE — PROGRESS NOTES
GYN ONCOLOGY CANCER SURVEILLANCE FOLLOW-UP    Lee Ann Cullen  [id number]  1950    Chief Complaint: Follow-up (no complaints)        History of present illness:  Lee Ann Cullen is a 68 y.o. year old female who is here today for ongoing surveillance of Vulvar Cancer, see Cancer History. She was last seen in 1/2019 for concern of new lesion. Biopsy was obtained and pathology was consistent with exam, showing non-specific ulceration. No recurrent dysplasia or viral effect noted. She is now almost 5 years out from completion of treatment and very pleased with her outcomes and care.   Patient reports a recent GI illness with N/V/D recently after her grandchild had the same illness. She treated this conservatively at home and is feeling much better now. She is feeling generally well today and has no new complaints. She denies vaginal bleeding, pelvic pain, concerning lesions, or changes in bowel or bladder function.      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 Stage IIIB grade 1 by final pathology, 7/10 lymph nodes positive.         5/16/2014 Imaging     PET/CT negative for distant metastases         6/2/2014 - 7/9/2014 Radiation     Pelvis received 45 Gy in 25 fractions with 6 MV photons utilizing IMRT treatment planning         6/2014 - 7/2014 Chemotherapy     Completed concurrent chemotherapy with Cisplatin for radiosensitivity         7/10/2014 - 7/16/2014 Radiation     Tumor bed was boosted with additional 5.4 Gy in 3 fractions with 6 MV photons, for a total of 50.4 Gy         8/21/2014 Imaging     Post-treatment CT scan negative for disease         1/10/2019 Biopsy     Biopsy of new lesion benign, nonspecific ulceration            Past Medical History:   Diagnosis Date   •  Bilateral cataracts    • Depression    • GERD (gastroesophageal reflux disease)    • History of basal cell carcinoma     nose   • HTN (hypertension)    • Hypothyroidism    • IC (interstitial cystitis)     managed by Dr. Funes   • Insomnia    • Lichen sclerosus of female genitalia    • MRSA (methicillin resistant staph aureus) culture positive    • Seasonal allergies    • Thyroid disease    • Vulvar cancer (CMS/HCC)        Past Surgical History:   Procedure Laterality Date   • APPENDECTOMY  1992   • BACK SURGERY  03/2016   • CATARACT EXTRACTION Bilateral    • CYSTOSCOPY BLADDER HYDRODISTENSION      with biopsies   • LAPAROSCOPIC CHOLECYSTECTOMY  05/2016   • OTHER SURGICAL HISTORY      microendoscopic disectomy   • TOTAL ABDOMINAL HYSTERECTOMY WITH SALPINGO OOPHORECTOMY     • TOTAL KNEE ARTHROPLASTY Left 2010   • VULVECTOMY Left 04/2014    modified radical vulvectomy with left inguinofemoral LND       MEDICATIONS: The current medication list was reviewed and reconciled.     Allergies:  is allergic to phenergan [promethazine hcl]; augmentin [amoxicillin-pot clavulanate]; and hydrocodone-acetaminophen.    Family History   Problem Relation Age of Onset   • Heart disease Mother    • Diabetes Mother    • Skin cancer Mother    • Heart disease Father         congestive heart failure   • Heart disease Other         5 brothers   • Breast cancer Maternal Aunt 50       Last imaging study was CT 8/12/2014.     Review of Systems   Constitutional: Negative for appetite change, chills, fatigue, fever and unexpected weight change.   Respiratory: Negative for cough, shortness of breath and wheezing.    Cardiovascular: Negative for chest pain, palpitations and leg swelling.   Gastrointestinal: Negative for abdominal distention, abdominal pain, blood in stool, constipation, diarrhea, nausea and vomiting.   Endocrine: Negative.    Genitourinary: Negative for dyspareunia, dysuria, frequency, genital sores, hematuria, pelvic pain,  urgency, vaginal bleeding, vaginal discharge and vaginal pain.   Musculoskeletal: Negative for arthralgias, gait problem and joint swelling.   Neurological: Negative for dizziness, seizures, syncope, weakness, light-headedness, numbness and headaches.   Hematological: Negative for adenopathy.   Psychiatric/Behavioral: Negative.        Physical Exam  Vital Signs: /65   Pulse 54   Resp 14   Wt 73.5 kg (162 lb)   SpO2 96%   BMI 26.96 kg/m²   Pain Score    05/08/19 0940   PainSc: 0-No pain      General Appearance:  alert, cooperative, no apparent distress and appears stated age   Neurologic/Psychiatric: A&O x 3, gait steady, appropriate affect   HEENT:  Normocephalic, without obvious abnormality, mucous membranes moist   Lungs:   Clear to auscultation bilaterally; respirations regular, even, and unlabored bilaterally   Heart:  Regular rate and rhythm, no murmurs appreciated   Abdomen:   Soft, non-tender, non-distended and no organomegaly   Lymph nodes: No axillary adenopathy noted   Extremities: Normal, atraumatic; no clubbing, cyanosis, or edema    Pelvic: External Genitalia  changes consistent with previous radiation and extensive vulvectomy. Lichen sclerosus well controlled. Several small hemangioma noted to right vulva. No new lesions or masses  Vagina  is pale, atrophic.  and changes consistent with previous radiation.  Vaginal Cuff  Female Vaginal Cuff: smooth, intact and without visible lesions  Uterus  surgically absent and no palpable masses  Ovaries  surgically absent bilaterallly  Parametria  smooth  Rectovaginal  Female rectovaginal: deferred     ECOG Performance Status: 0 - Asymptomatic    Procedure Note:  No notes on file      Assessment and Plan:  Lee Ann was seen today for follow-up.    Diagnoses and all orders for this visit:    Vulvar cancer (CMS/HCC)    Lichen sclerosus of female genitalia        There is no evidence of disease upon today's exam. We discussed plan for one more 6 month visit,  then go to yearly exams. Continue annual well woman visits with Dr. Funes. She is understanding to call with any changes in pelvic symptoms or general GYN concerns at any time between regularly scheduled visits.       Return to clinic in 6 months for ongoing cancer surveillance.      Desiree Mcallister, APRN

## 2019-11-26 ENCOUNTER — OFFICE VISIT (OUTPATIENT)
Dept: GYNECOLOGIC ONCOLOGY | Facility: CLINIC | Age: 69
End: 2019-11-26

## 2019-11-26 VITALS
BODY MASS INDEX: 27.96 KG/M2 | TEMPERATURE: 96.9 F | DIASTOLIC BLOOD PRESSURE: 72 MMHG | HEART RATE: 70 BPM | WEIGHT: 168 LBS | OXYGEN SATURATION: 98 % | RESPIRATION RATE: 16 BRPM | SYSTOLIC BLOOD PRESSURE: 161 MMHG

## 2019-11-26 DIAGNOSIS — C51.9 VULVAR CANCER (HCC): Primary | ICD-10-CM

## 2019-11-26 PROBLEM — I10 ESSENTIAL HYPERTENSION: Status: ACTIVE | Noted: 2019-11-26

## 2019-11-26 PROBLEM — E03.9 HYPOTHYROIDISM: Status: ACTIVE | Noted: 2019-11-26

## 2019-11-26 PROBLEM — K21.9 GASTROESOPHAGEAL REFLUX DISEASE WITHOUT ESOPHAGITIS: Status: ACTIVE | Noted: 2017-01-01

## 2019-11-26 PROBLEM — E78.00 HYPERCHOLESTEROLEMIA: Status: ACTIVE | Noted: 2019-11-26

## 2019-11-26 PROCEDURE — 99213 OFFICE O/P EST LOW 20 MIN: CPT | Performed by: NURSE PRACTITIONER

## 2019-11-26 RX ORDER — MAGNESIUM OXIDE 400 MG/1
400 TABLET ORAL 3 TIMES WEEKLY
COMMUNITY

## 2019-12-06 NOTE — PROGRESS NOTES
GYN ONCOLOGY CANCER SURVEILLANCE FOLLOW-UP    Lee Ann Cullen  [id number]  1950    Chief Complaint: Follow-up (still itching and burning, no worse, Dr. Funes did bx's in 9/2019, they were negative)        History of present illness:  Lee Ann Cullen is a 69 y.o. year old female who is here today for ongoing surveillance of Vulvar Cancer, see Cancer History. She was last seen in 1/2019 for concern of new lesion. Biopsy was obtained and pathology was consistent with exam, showing non-specific ulceration. No recurrent dysplasia or viral effect noted. She is now over 5 years out from completion of treatment. She reports ongoing occasional vulvar itching and burning, no worse or progressive than previous.  She was seen by Dr. Funes in 9/2019.  Patient reports biopsies were performed at that time and were all benign.  She also has known severe lichen sclerosis.  She uses Temovate topical very sparingly to help control symptoms, as well understanding of risks and benefits.  She denies any new obvious or concerning lesions.  Patient is scheduled for follow-up with gynecologist in December 2019.       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 Stage IIIB grade 1 by final pathology, 7/10 lymph nodes positive.         5/16/2014 Imaging     PET/CT negative for distant metastases         6/2/2014 - 7/9/2014 Radiation     Pelvis received 45 Gy in 25 fractions with 6 MV photons utilizing IMRT treatment planning         6/2014 - 7/2014 Chemotherapy     Completed concurrent chemotherapy with Cisplatin for radiosensitivity         7/10/2014 - 7/16/2014 Radiation     Tumor bed was boosted with additional 5.4 Gy in 3 fractions with 6 MV photons, for a total of 50.4 Gy         8/21/2014 Imaging      Post-treatment CT scan negative for disease         1/10/2019 Biopsy     Biopsy of new lesion benign, nonspecific ulceration            Past Medical History:   Diagnosis Date   • Bilateral cataracts    • Depression    • GERD (gastroesophageal reflux disease)    • History of basal cell carcinoma     nose   • HTN (hypertension)    • Hypothyroidism    • IC (interstitial cystitis)     managed by Dr. Funes   • Insomnia    • Lichen sclerosus of female genitalia    • MRSA (methicillin resistant staph aureus) culture positive    • Seasonal allergies    • Thyroid disease    • Vulvar cancer (CMS/HCC)        Past Surgical History:   Procedure Laterality Date   • APPENDECTOMY  1992   • BACK SURGERY  03/2016   • CATARACT EXTRACTION Bilateral    • CYSTOSCOPY BLADDER HYDRODISTENSION      with biopsies   • LAPAROSCOPIC CHOLECYSTECTOMY  05/2016   • OTHER SURGICAL HISTORY      microendoscopic disectomy   • TOTAL ABDOMINAL HYSTERECTOMY WITH SALPINGO OOPHORECTOMY     • TOTAL KNEE ARTHROPLASTY Left 2010   • VULVECTOMY Left 04/2014    modified radical vulvectomy with left inguinofemoral LND       MEDICATIONS: The current medication list was reviewed and reconciled.     Allergies:  is allergic to phenergan [promethazine hcl]; augmentin [amoxicillin-pot clavulanate]; and hydrocodone-acetaminophen.    Family History   Problem Relation Age of Onset   • Heart disease Mother    • Diabetes Mother    • Skin cancer Mother    • Heart disease Father         congestive heart failure   • Heart disease Other         5 brothers   • Breast cancer Maternal Aunt 50       Last imaging study was CT 8/12/2014.     Review of Systems   Constitutional: Negative for appetite change, chills, fatigue, fever and unexpected weight change.   Respiratory: Negative for cough, shortness of breath and wheezing.    Cardiovascular: Negative for chest pain, palpitations and leg swelling.   Gastrointestinal: Negative for abdominal distention, abdominal pain, blood in stool,  constipation, diarrhea, nausea and vomiting.   Endocrine: Negative.    Genitourinary: Negative for dyspareunia, dysuria, frequency, genital sores, hematuria, pelvic pain, urgency, vaginal bleeding, vaginal discharge and vaginal pain.   Musculoskeletal: Negative for arthralgias, gait problem and joint swelling.   Neurological: Negative for dizziness, seizures, syncope, weakness, light-headedness, numbness and headaches.   Hematological: Negative for adenopathy.   Psychiatric/Behavioral: Negative.        Physical Exam  Vital Signs: /72   Pulse 70   Temp 96.9 °F (36.1 °C) (Temporal)   Resp 16   Wt 76.2 kg (168 lb)   SpO2 98%   BMI 27.96 kg/m²   Pain Score    11/26/19 1343   PainSc: 0-No pain      General Appearance:  alert, cooperative, no apparent distress and appears stated age   Neurologic/Psychiatric: A&O x 3, gait steady, appropriate affect   HEENT:  Normocephalic, without obvious abnormality, mucous membranes moist   Lungs:   Clear to auscultation bilaterally; respirations regular, even, and unlabored bilaterally   Heart:  Regular rate and rhythm, no murmurs appreciated   Abdomen:   Soft, non-tender, non-distended and no organomegaly   Lymph nodes: No axillary adenopathy noted   Extremities: Normal, atraumatic; no clubbing, cyanosis, or edema    Pelvic: External Genitalia  changes consistent with previous radiation and extensive vulvectomy. Lichen sclerosus well controlled. Several small hemangioma noted to right vulva. No new lesions or masses  Vagina  is pale, atrophic.  and changes consistent with previous radiation.  Vaginal Cuff  Female Vaginal Cuff: smooth, intact and without visible lesions  Uterus  surgically absent and no palpable masses  Ovaries  surgically absent bilaterallly  Parametria  smooth  Rectovaginal  Female rectovaginal: deferred     ECOG Performance Status: 0 - Asymptomatic    Procedure Note:  No notes on file      Assessment and Plan:  Lee Ann was seen today for  follow-up.    Diagnoses and all orders for this visit:    Vulvar cancer (CMS/HCC)        There is no evidence of disease upon today's exam. Patient desires one more 6 month visit, then yearly exams if doing well. Continue annual well woman visits with Dr. Funes. She is understanding to call with any changes in pelvic symptoms or general GYN concerns at any time between regularly scheduled visits.       Return to clinic in 6 months for ongoing cancer surveillance.      JOHNNY Rodriguez

## 2020-01-07 ENCOUNTER — LAB (OUTPATIENT)
Dept: LAB | Facility: HOSPITAL | Age: 70
End: 2020-01-07

## 2020-01-07 ENCOUNTER — OFFICE VISIT (OUTPATIENT)
Dept: GYNECOLOGIC ONCOLOGY | Facility: CLINIC | Age: 70
End: 2020-01-07

## 2020-01-07 VITALS
WEIGHT: 170.9 LBS | SYSTOLIC BLOOD PRESSURE: 170 MMHG | HEART RATE: 59 BPM | TEMPERATURE: 98.1 F | RESPIRATION RATE: 16 BRPM | OXYGEN SATURATION: 97 % | BODY MASS INDEX: 28.47 KG/M2 | HEIGHT: 65 IN | DIASTOLIC BLOOD PRESSURE: 78 MMHG

## 2020-01-07 DIAGNOSIS — N90.4 LICHEN SCLEROSUS OF FEMALE GENITALIA: ICD-10-CM

## 2020-01-07 DIAGNOSIS — R30.0 DYSURIA: ICD-10-CM

## 2020-01-07 DIAGNOSIS — C51.9 VULVAR CANCER (HCC): Primary | ICD-10-CM

## 2020-01-07 LAB
BILIRUB UR QL STRIP: NEGATIVE
CLARITY UR: CLEAR
COLOR UR: YELLOW
GLUCOSE UR STRIP-MCNC: NEGATIVE MG/DL
HGB UR QL STRIP.AUTO: NEGATIVE
KETONES UR QL STRIP: NEGATIVE
LEUKOCYTE ESTERASE UR QL STRIP.AUTO: NEGATIVE
NITRITE UR QL STRIP: NEGATIVE
PH UR STRIP.AUTO: 5.5 [PH] (ref 5–8)
PROT UR QL STRIP: NEGATIVE
SP GR UR STRIP: 1.02 (ref 1–1.03)
UROBILINOGEN UR QL STRIP: NORMAL

## 2020-01-07 PROCEDURE — 81003 URINALYSIS AUTO W/O SCOPE: CPT

## 2020-01-07 PROCEDURE — 99214 OFFICE O/P EST MOD 30 MIN: CPT | Performed by: NURSE PRACTITIONER

## 2020-01-08 ENCOUNTER — TELEPHONE (OUTPATIENT)
Dept: GYNECOLOGIC ONCOLOGY | Facility: CLINIC | Age: 70
End: 2020-01-08

## 2020-01-08 NOTE — TELEPHONE ENCOUNTER
I called patient and let her know results are negative. She v/u and will call if new medications do not help itching.

## 2020-01-08 NOTE — PROGRESS NOTES
GYN ONCOLOGY CANCER SURVEILLANCE FOLLOW-UP    Lee Ann Cullen  [id number]  1950    Chief Complaint: Follow-up (vulvar itching)        History of present illness:  Lee Ann Cullen is a 69 y.o. year old female who is here today for ongoing surveillance of Vulvar Cancer, see Cancer History. She is now over 5 years out from completion of treatment.  Last biopsies here in 1/2019 and at Dr. Funes's office in 9/2019 all benign. She reports ongoing vulvar itching and burning, no worse or progressive than previous.  She also has known severe lichen sclerosis and she has kept a positive attitude despite difficult management.  She was prescribed Mycolog by Dr. Funes last month, but has not helped.  She denies any new obvious or concerning lesions.  She uses Temovate very sparingly as she is understanding of risks and benefits.  She has used both Silvadene and vaginal estrogen topically in the past, not currently using either. She does note occasional dysuria and requests urine evaluation.        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 Stage IIIB grade 1 by final pathology, 7/10 lymph nodes positive.      5/16/2014 Imaging     PET/CT negative for distant metastases      6/2/2014 - 7/9/2014 Radiation     Pelvis received 45 Gy in 25 fractions with 6 MV photons utilizing IMRT treatment planning      6/2014 - 7/2014 Chemotherapy     Completed concurrent chemotherapy with Cisplatin for radiosensitivity      7/10/2014 - 7/16/2014 Radiation     Tumor bed was boosted with additional 5.4 Gy in 3 fractions with 6 MV photons, for a total of 50.4 Gy      8/21/2014 Imaging     Post-treatment CT scan negative for disease      1/10/2019 Biopsy     Biopsy of new lesion benign, nonspecific ulceration          Past Medical History:   Diagnosis Date   • Bilateral cataracts    • Depression    • GERD (gastroesophageal reflux disease)    • History of basal cell carcinoma     nose   • HTN (hypertension)    • Hypothyroidism    • IC (interstitial cystitis)     managed by Dr. Funes   • Insomnia    • Lichen sclerosus of female genitalia    • MRSA (methicillin resistant staph aureus) culture positive    • Seasonal allergies    • Thyroid disease    • Vulvar cancer (CMS/HCC)        Past Surgical History:   Procedure Laterality Date   • APPENDECTOMY  1992   • BACK SURGERY  03/2016   • CATARACT EXTRACTION Bilateral    • CYSTOSCOPY BLADDER HYDRODISTENSION      with biopsies   • LAPAROSCOPIC CHOLECYSTECTOMY  05/2016   • OTHER SURGICAL HISTORY      microendoscopic disectomy   • TOTAL ABDOMINAL HYSTERECTOMY WITH SALPINGO OOPHORECTOMY     • TOTAL KNEE ARTHROPLASTY Left 2010   • VULVECTOMY Left 04/2014    modified radical vulvectomy with left inguinofemoral LND       MEDICATIONS: The current medication list was reviewed and reconciled.     Allergies:  is allergic to phenergan [promethazine hcl]; augmentin [amoxicillin-pot clavulanate]; and hydrocodone-acetaminophen.    Family History   Problem Relation Age of Onset   • Heart disease Mother    • Diabetes Mother    • Skin cancer Mother    • Heart disease Father         congestive heart failure   • Heart disease Other         5 brothers   • Breast cancer Maternal Aunt 50       Last imaging study was CT 8/12/2014.     Review of Systems   Constitutional: Negative for appetite change, chills, fatigue, fever and unexpected weight change.   Respiratory: Negative for cough, shortness of breath and wheezing.    Cardiovascular: Negative for chest pain, palpitations and leg swelling.   Gastrointestinal: Negative for abdominal distention, abdominal pain, blood in stool, constipation, diarrhea, nausea and vomiting.   Endocrine: Negative.    Genitourinary: Negative for dyspareunia, dysuria,  "frequency, genital sores, hematuria, pelvic pain, urgency, vaginal bleeding, vaginal discharge and vaginal pain.   Musculoskeletal: Negative for arthralgias, gait problem and joint swelling.   Neurological: Negative for dizziness, seizures, syncope, weakness, light-headedness, numbness and headaches.   Hematological: Negative for adenopathy.   Psychiatric/Behavioral: Negative.        Physical Exam  Vital Signs: /78   Pulse 59   Temp 98.1 °F (36.7 °C) (Oral)   Resp 16   Ht 165.1 cm (65\")   Wt 77.5 kg (170 lb 14.4 oz)   SpO2 97%   BMI 28.44 kg/m²   Pain Score    01/07/20 1449   PainSc: 0-No pain      General Appearance:  alert, cooperative, no apparent distress and appears stated age   Neurologic/Psychiatric: A&O x 3, gait steady, appropriate affect   HEENT:  Normocephalic, without obvious abnormality, mucous membranes moist   Lungs:   Clear to auscultation bilaterally; respirations regular, even, and unlabored bilaterally   Heart:  Regular rate and rhythm, no murmurs appreciated   Abdomen:   Soft, non-tender, non-distended and no organomegaly   Lymph nodes: No axillary adenopathy noted   Extremities: Normal, atraumatic; no clubbing, cyanosis, or edema    Pelvic: External Genitalia  changes consistent with previous radiation and extensive vulvectomy. Lichen sclerosus extensive but stable.  No new lesions or masses  Vagina  is pale, atrophic.  and changes consistent with previous radiation.  Vaginal Cuff  Female Vaginal Cuff: smooth, intact and without visible lesions  Uterus  surgically absent and no palpable masses  Ovaries  surgically absent bilaterallly  Parametria  smooth  Rectovaginal  Female rectovaginal: deferred     ECOG Performance Status: 0 - Asymptomatic    Procedure Note:  No notes on file      Assessment and Plan:  Lee Ann was seen today for follow-up.    Diagnoses and all orders for this visit:    Vulvar cancer (CMS/[facility])    Lichen sclerosus of female genitalia    Dysuria  -     Urinalysis With " Culture If Indicated -; Future    Other orders  -     conjugated estrogens (PREMARIN) 0.625 MG/GM vaginal cream; Insert  into the vagina 3 (Three) Times a Week. Apply externally as directed.        There is no evidence of disease upon today's exam. Patient desires one more 6 month visit, then yearly exams if doing well. Continue annual well woman visits with Dr. Funes. She is understanding to call with any changes in pelvic symptoms or general GYN concerns at any time between regularly scheduled visits.     For her continued vulvar itching, I have prescribed Premarin vaginal cream in hopes of strengthening vulvar tissues and promoting new healthy cell growth.  We also discussed options such as resuming Silvadene topical or trial of Elidel 1% in the future.  We briefly discussed results of clinical trial showing benefit of Elidel (used to treat eczema/atopic dermatitis in patients who have not responded to other medications such as topical steroids) in patients with lichen sclerosus. She is to call if symptoms become worse or she is having new problems.     A urinalysis was collected today due to her c/o occasional dysuria and she will be notified upon the return of results.  Any abnormalities will be treated accordingly.      Return to clinic in 6 months for ongoing cancer surveillance.      JOHNNY Rodriguez

## 2020-01-08 NOTE — TELEPHONE ENCOUNTER
----- Message from JOHNNY Rodriguez sent at 1/7/2020  7:15 PM EST -----  Please notify UA negative. Thanks!

## 2020-04-02 ENCOUNTER — TELEPHONE (OUTPATIENT)
Dept: GYNECOLOGIC ONCOLOGY | Facility: CLINIC | Age: 70
End: 2020-04-02

## 2020-05-11 ENCOUNTER — OFFICE VISIT (OUTPATIENT)
Dept: GYNECOLOGIC ONCOLOGY | Facility: CLINIC | Age: 70
End: 2020-05-11

## 2020-05-11 VITALS
RESPIRATION RATE: 14 BRPM | SYSTOLIC BLOOD PRESSURE: 134 MMHG | TEMPERATURE: 97.3 F | HEART RATE: 69 BPM | WEIGHT: 163 LBS | DIASTOLIC BLOOD PRESSURE: 72 MMHG | BODY MASS INDEX: 27.12 KG/M2

## 2020-05-11 DIAGNOSIS — C51.9 VULVAR CANCER (HCC): Primary | ICD-10-CM

## 2020-05-11 DIAGNOSIS — N90.4 LICHEN SCLEROSUS OF FEMALE GENITALIA: ICD-10-CM

## 2020-05-11 DIAGNOSIS — R10.2 VULVAR PAIN: ICD-10-CM

## 2020-05-11 PROCEDURE — 99214 OFFICE O/P EST MOD 30 MIN: CPT | Performed by: NURSE PRACTITIONER

## 2020-05-11 RX ORDER — PIMECROLIMUS 10 MG/G
CREAM TOPICAL 2 TIMES DAILY
Qty: 60 G | Refills: 5 | Status: SHIPPED | OUTPATIENT
Start: 2020-05-11 | End: 2021-01-08

## 2020-05-18 ENCOUNTER — TELEPHONE (OUTPATIENT)
Dept: GYNECOLOGIC ONCOLOGY | Facility: CLINIC | Age: 70
End: 2020-05-18

## 2020-05-18 ENCOUNTER — HOSPITAL ENCOUNTER (OUTPATIENT)
Dept: CT IMAGING | Facility: HOSPITAL | Age: 70
Discharge: HOME | End: 2020-05-18
Admitting: NURSE PRACTITIONER

## 2020-05-18 DIAGNOSIS — R10.2 VULVAR PAIN: ICD-10-CM

## 2020-05-18 DIAGNOSIS — C51.9 VULVAR CANCER (HCC): ICD-10-CM

## 2020-05-18 LAB — CREAT BLDA-MCNC: 1 MG/DL (ref 0.6–1.3)

## 2020-05-18 PROCEDURE — 82565 ASSAY OF CREATININE: CPT

## 2020-05-18 PROCEDURE — 74177 CT ABD & PELVIS W/CONTRAST: CPT

## 2020-05-18 PROCEDURE — 25010000002 IOPAMIDOL 61 % SOLUTION: Performed by: NURSE PRACTITIONER

## 2020-05-18 PROCEDURE — 0 DIATRIZOATE MEGLUMINE & SODIUM PER 1 ML: Performed by: NURSE PRACTITIONER

## 2020-05-18 RX ADMIN — IOPAMIDOL 100 ML: 612 INJECTION, SOLUTION INTRAVENOUS at 08:24

## 2020-05-18 RX ADMIN — DIATRIZOATE MEGLUMINE AND DIATRIZOATE SODIUM 30 ML: 660; 100 LIQUID ORAL; RECTAL at 08:24

## 2020-05-18 NOTE — TELEPHONE ENCOUNTER
Patient called and notified CT negative for disease. Patient encouraged to begin treatment with Elidel as discussed at last visit.

## 2021-01-07 NOTE — PROGRESS NOTES
Lee Ann Cullen  [id number]  1950      Reason for visit: Recurrent vulvar cancer      History of present illness:  The patient is a 70 y.o. year old female who presents today for treatment and evaluation of the above issues.  She has been followed by both our office and Dr. 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  She underwent treatment with cisplatin and pelvic radiation.  She was last seen in our office in May and had no evidence of recurrent disease at that time.  Over the last few months, she developed an area of growth at her prior excision site.  An office biopsy by Dr. Funes was benign. She underwent wide local excision of the right labia majora on November 25, 2020. Final pathology revealed a recurrent poorly differentiated neoplasm. Today she reports the biopsy site is healing. She does continue to have some itching and have some drainage from the site at times.  She reports urinary frequency. She feels otherwise well. She is anxious to begin treatment.    Oncologic History:  Oncology/Hematology History   Vulvar cancer (CMS/[facility])   3/19/2014 Initial Diagnosis    Moderately differentiated squamous cell carcinoma identified upon biopsies of vulvar lesion and labia minora     4/28/2014 Surgery    Modified radical left vulvectomy and left inguinofemoral LND. Stage IIIB grade 1 by final pathology, 7/10 lymph nodes positive.     5/16/2014 Imaging    PET/CT negative for distant metastases     6/2/2014 - 7/9/2014 Radiation    Pelvis received 45 Gy in 25 fractions with 6 MV photons utilizing IMRT treatment planning     6/2014 - 7/2014 Chemotherapy    Completed concurrent chemotherapy with Cisplatin for radiosensitivity     7/10/2014 - 7/16/2014 Radiation    Tumor bed was boosted with additional 5.4 Gy in 3 fractions with 6 MV photons, for a total of 50.4 Gy     8/21/2014 Imaging    Post-treatment CT scan negative for disease     1/10/2019 Biopsy    Biopsy  of new lesion benign, nonspecific ulceration           Past Medical History:   Diagnosis Date   • Bilateral cataracts    • Depression    • GERD (gastroesophageal reflux disease)    • History of basal cell carcinoma     nose   • HTN (hypertension)    • Hypothyroidism    • IC (interstitial cystitis)     managed by Dr. Funes   • Insomnia    • Lichen sclerosus of female genitalia    • MRSA (methicillin resistant staph aureus) culture positive    • Seasonal allergies    • Thyroid disease    • Vulvar cancer (CMS/HCC)        Past Surgical History:   Procedure Laterality Date   • APPENDECTOMY  1992   • BACK SURGERY  03/2016   • CATARACT EXTRACTION Bilateral    • CYSTOSCOPY BLADDER HYDRODISTENSION      with biopsies   • LAPAROSCOPIC CHOLECYSTECTOMY  05/2016   • OTHER SURGICAL HISTORY      microendoscopic disectomy   • TOTAL ABDOMINAL HYSTERECTOMY WITH SALPINGO OOPHORECTOMY     • TOTAL KNEE ARTHROPLASTY Left 2010   • VULVECTOMY Left 04/2014    modified radical vulvectomy with left inguinofemoral LND       MEDICATIONS: The current medication list was reviewed with the patient and updated in the EMR this date per the Medical Assistant. Medication dosages and frequencies were confirmed to be accurate.      Allergies:  is allergic to phenergan [promethazine hcl]; augmentin [amoxicillin-pot clavulanate]; and hydrocodone-acetaminophen.    Social History:   Social History     Socioeconomic History   • Marital status:      Spouse name: Not on file   • Number of children: 2   • Years of education: Not on file   • Highest education level: Not on file   Tobacco Use   • Smoking status: Never Smoker   • Smokeless tobacco: Never Used   Substance and Sexual Activity   • Alcohol use: No   • Drug use: No   • Sexual activity: Yes     Partners: Male     Birth control/protection: Surgical       Family History:    Family History   Problem Relation Age of Onset   • Heart disease Mother    • Diabetes Mother    • Skin cancer Mother    •  Heart disease Father         congestive heart failure   • Heart disease Other         5 brothers   • Breast cancer Maternal Aunt 50       Health Maintenance:    Health Maintenance   Topic Date Due   • TDAP/TD VACCINES (1 - Tdap) 10/08/1970   • ZOSTER VACCINE (1 of 2) 10/08/2001   • Pneumococcal Vaccine 65+ (1 of 1 - PPSV23) 10/08/2016   • HEPATITIS C SCREENING  08/07/2017   • ANNUAL WELLNESS VISIT  08/07/2017   • MAMMOGRAM  08/07/2017   • LIPID PANEL  11/26/2019   • INFLUENZA VACCINE  08/01/2020   • COLONOSCOPY  12/12/2028   • MENINGOCOCCAL VACCINE  Aged Out         Review of Systems   Constitutional: Positive for fatigue. Negative for appetite change, chills, fever and unexpected weight change.   HENT: Negative for ear discharge, ear pain, hearing loss, mouth sores, nosebleeds, postnasal drip, sinus pressure, sinus pain, sore throat, tinnitus and trouble swallowing.    Eyes: Negative for pain, itching and visual disturbance.   Respiratory: Negative for cough, shortness of breath and wheezing.    Cardiovascular: Negative for chest pain and palpitations.   Gastrointestinal: Negative for abdominal pain, blood in stool, constipation, diarrhea, nausea and vomiting.   Endocrine: Negative for heat intolerance.   Genitourinary: Positive for frequency. Negative for difficulty urinating, flank pain, hematuria, urgency, vaginal bleeding and vaginal discharge.        Vulvar pain and itching   Musculoskeletal: Negative for arthralgias, gait problem and myalgias.   Skin: Negative for color change, rash and wound.   Allergic/Immunologic: Negative for immunocompromised state.   Neurological: Negative for dizziness, seizures, syncope, weakness, numbness and headaches.   Hematological: Negative for adenopathy. Does not bruise/bleed easily.   Psychiatric/Behavioral: Negative for agitation, confusion, dysphoric mood and sleep disturbance. The patient is nervous/anxious.        Physical Exam    Vitals:    01/08/21 1043   BP: 163/75  "  Pulse: 70   Resp: 16   Temp: 96.2 °F (35.7 °C)   TempSrc: Temporal   SpO2: 91%   Weight: 70.8 kg (156 lb)   Height: 167.6 cm (66\")   PainSc: 0-No pain       Body mass index is 25.18 kg/m².    Wt Readings from Last 3 Encounters:   01/08/21 70.8 kg (156 lb)   05/11/20 73.9 kg (163 lb)   01/07/20 77.5 kg (170 lb 14.4 oz)     GENERAL: Alert, well-appearing female appearing her stated age who is in no apparent distress.   HEENT: Sclera anicteric. Head normocephalic, atraumatic. Mucus membranes moist.   NECK: Trachea midline, supple, without masses.  No thyromegaly.   BREASTS: Deferred  CARDIOVASCULAR: Normal rate, regular rhythm, no murmurs, rubs, or gallops. No peripheral edema.  RESPIRATORY: Clear to auscultation bilaterally, normal respiratory effort  BACK:  No CVA tenderness, no vertebral tenderness on palpation  GASTROINTESTINAL:  Abdomen is soft, non-tender, non-distended, no rebound or guarding, no masses, or hernias. No HSM.    SKIN:  Warm, dry, well-perfused.  All visible areas intact.  No rashes, lesions, ulcers.  PSYCHIATRIC: AO x3, with appropriate affect, normal thought processes.  NEUROLOGIC: No focal deficits. Moves extremities well.  MUSCULOSKELETAL: Normal gait and station.   EXTREMITIES:   No cyanosis, clubbing, symmetric.  LYMPHATICS:  No cervical or inguinal adenopathy noted.     PELVIC exam:    External genitalia remarkable for right-sided lesion consistent with prior surgical site which is 5 to 6 cm in length with superficial separation.  Tumor versus ongoing healing with granulation tissue visible at 2 aspects of the wound.  On palpation, there is induration underneath the wound which could be related to normal healing or tumor.  Lichen sclerosis versus MITZI at anterior aspect of perianal area extending from 9-3 o'clock.  No obvious vaginal lesions at introitus, remainder of gynecologic exam deferred.    ECOG PS 0    PROCEDURES: None    Diagnostic Data:    Pathology:  Vulva, lesion  Malignant " poorly differentiated neoplasm consistent with either a radiation-induced sarcoma or dedifferentiated invasive squamous cell carcinoma extending to peripheral margin and close to deep margin    No Images in the past 120 days found..    Lab Results   Component Value Date    WBC 6.44 04/27/2016    HGB 13.9 04/27/2016    HCT 39.9 04/27/2016    MCV 88.7 04/27/2016     04/27/2016    NEUTROABS 1.2 (L) 07/25/2014    GLUCOSE 84 04/27/2016    BUN 12 04/27/2016    CREATININE 1.00 05/18/2020     04/27/2016    K 4.0 04/27/2016     04/27/2016    CO2 29 04/27/2016    MG 1.1 (L) 07/25/2014    PHOS 2.0 (L) 07/14/2014    CALCIUM 9.6 04/27/2016    ALBUMIN 4.1 04/27/2016    AST 18 04/27/2016    ALT 16 04/27/2016    BILITOT 0.5 04/27/2016     No results found for:         Assessment/Plan   This is a 70 y.o. woman recurrent vulvar cancer.  Encounter Diagnoses   Name Primary?   • Vulvar cancer (CMS/HCC) Yes   • Malignant neoplasm of overlapping sites of vulva (CMS/HCC)       Recurrent Vulvar Squamous Cell Carcinoma  Pathology from Dr. Funes's excision reviewed.  We will plan to obtain PET scan to evaluate for distant recurrence.  We will plan to proceed with repeat excision following PET scan if it is negative for distant recurrence.  If it is positive, this could significantly impact the patient's treatment plan.    Patient was consented for modified right radical vulvectomy, perianal biopsy.      Risks and benefits of surgery were discussed.  This included, but was not limited to, infection and bleeding like when the skin is cut; damage to surrounding structures; and incisional complications.  Risk of DVT was addressed for major surgeries.  Standard of care efforts to minimize these risks were reviewed.  Typical hospital stay and recovery were discussed as well as post-procedure precautions.  Surgical implications of chronic illnesses on recovery and surgical outcome were reviewed.     Pain medication regimen  for postoperative care was discussed.  Typical regimen and avoidance of narcotics was discussed.  Patient was educated that other factors, such as existing narcotic use, can impact postoperative pain management.      Patient verbalized understanding of the plan including the risks and benefits.  Appropriate perioperative testing including laboratory evaluation, EKG as clinically indicated, chest x-ray as clinically indicated, and preadmission evaluation were all ordered as a part of this patient's care.    Pain assessment was performed today as a part of patient’s care.  For patients with pain related to surgery, gynecologic malignancy or cancer treatment, the plan is as noted in the assessment/plan.  For patients with pain not related to these issues, they are to seek any further needed care from a more appropriate provider, such as PCP.      Orders Placed This Encounter   Procedures   • NM Pet Skull Base To Mid Thigh     Standing Status:   Future     Standing Expiration Date:   1/8/2022     Order Specific Question:   What radiopharmaceutical is preferred for this exam?     Answer:   FDG  (offered at all sites)   • XR Chest 2 View     Standing Status:   Future     Standing Expiration Date:   1/8/2022     Order Specific Question:   Reason for Exam:     Answer:   PRE OP   • Comprehensive Metabolic Panel     Standing Status:   Future     Standing Expiration Date:   1/8/2022   • Verify NPO Status     Standing Status:   Future   • Obtain Informed Consent     Standing Status:   Future     Order Specific Question:   Informed Consent Given For     Answer:   MODIFIED RIGHT RADICAL VULVECTOMY, PERIANAL BIOPSY   • Provide Instructions to Patient on NPO Status     Standing Status:   Future   • Chlorhexidine Skin Prep     Chlorhexidine Skin Prep and Instructions For All Patients Having A Procedure Requiring an Outward Incision if Not Allergic. If Allergic, Give Antibacterial Skin Wipes and Instructions. Do Not Use For Facial  Cases or on Any Mucus Membranes.     Standing Status:   Future   • Instruct Patient on Coughing, Deep Breathing & Incentive Spirometry     Standing Status:   Future   • ECG 12 Lead     Standing Status:   Future     Standing Expiration Date:   1/8/2022     Order Specific Question:   Reason for Exam:     Answer:   PRE OP   • Type & Screen     Standing Status:   Future     Standing Expiration Date:   1/8/2022   • CBC & Differential     Standing Status:   Future     Standing Expiration Date:   1/8/2022     Order Specific Question:   Manual Differential     Answer:   No       FOLLOW UP: PET scan, surgery    Patient was seen and examined with Dr. Solano,  resident, who performed portions of the examination and documentation for this patient's care under my direct supervision.  I agree with the above documentation and plan.    Mikaela Mccauley MD  01/09/21  11:40 EST

## 2021-01-08 ENCOUNTER — OFFICE VISIT (OUTPATIENT)
Dept: GYNECOLOGIC ONCOLOGY | Facility: CLINIC | Age: 71
End: 2021-01-08

## 2021-01-08 VITALS
WEIGHT: 156 LBS | HEIGHT: 66 IN | OXYGEN SATURATION: 91 % | SYSTOLIC BLOOD PRESSURE: 163 MMHG | BODY MASS INDEX: 25.07 KG/M2 | TEMPERATURE: 96.2 F | RESPIRATION RATE: 16 BRPM | DIASTOLIC BLOOD PRESSURE: 75 MMHG | HEART RATE: 70 BPM

## 2021-01-08 DIAGNOSIS — C51.9 RECURRENT VULVAR CANCER (HCC): ICD-10-CM

## 2021-01-08 DIAGNOSIS — C51.9 VULVAR CANCER (HCC): Primary | ICD-10-CM

## 2021-01-08 DIAGNOSIS — C51.8 MALIGNANT NEOPLASM OF OVERLAPPING SITES OF VULVA (HCC): ICD-10-CM

## 2021-01-08 PROCEDURE — 99215 OFFICE O/P EST HI 40 MIN: CPT | Performed by: OBSTETRICS & GYNECOLOGY

## 2021-01-08 RX ORDER — CELECOXIB 100 MG/1
200 CAPSULE ORAL ONCE
Status: CANCELLED | OUTPATIENT
Start: 2021-01-08 | End: 2021-01-08

## 2021-01-08 RX ORDER — ACETAMINOPHEN 325 MG/1
650 TABLET ORAL ONCE
Status: CANCELLED | OUTPATIENT
Start: 2021-01-08 | End: 2021-01-08

## 2021-01-09 PROBLEM — C51.9 RECURRENT VULVAR CANCER (HCC): Status: ACTIVE | Noted: 2021-01-09

## 2021-01-22 ENCOUNTER — HOSPITAL ENCOUNTER (OUTPATIENT)
Dept: PET IMAGING | Facility: HOSPITAL | Age: 71
Discharge: HOME | End: 2021-01-22

## 2021-01-22 DIAGNOSIS — C51.9 VULVAR CANCER (HCC): ICD-10-CM

## 2021-01-22 DIAGNOSIS — C51.8 MALIGNANT NEOPLASM OF OVERLAPPING SITES OF VULVA (HCC): ICD-10-CM

## 2021-01-22 LAB — GLUCOSE BLDC GLUCOMTR-MCNC: 94 MG/DL (ref 70–130)

## 2021-01-22 PROCEDURE — 0 FLUDEOXYGLUCOSE F18 SOLUTION: Performed by: OBSTETRICS & GYNECOLOGY

## 2021-01-22 PROCEDURE — 82962 GLUCOSE BLOOD TEST: CPT

## 2021-01-22 PROCEDURE — A9552 F18 FDG: HCPCS | Performed by: OBSTETRICS & GYNECOLOGY

## 2021-01-22 PROCEDURE — 78815 PET IMAGE W/CT SKULL-THIGH: CPT

## 2021-01-22 RX ADMIN — FLUDEOXYGLUCOSE F18 1 DOSE: 300 INJECTION INTRAVENOUS at 08:31

## 2021-01-26 ENCOUNTER — TELEPHONE (OUTPATIENT)
Dept: GYNECOLOGIC ONCOLOGY | Facility: CLINIC | Age: 71
End: 2021-01-26

## 2021-01-26 NOTE — TELEPHONE ENCOUNTER
CALLER: JESS    REASON:     PT REQUESTING SOMEONE PLEASE CALL WHEN PET SCAN RESULTS FROM 1/22/21 COME IN.      BEST C/B: [phone]

## 2021-01-26 NOTE — TELEPHONE ENCOUNTER
I called patient to discuss negative PET/CT.  I communicated with Dr. Dominique and patient did receive radiation to the right inguinofemoral lymph node group with her initial radiation.  Therefore, we will proceed with modified radical vulvectomy.  Patient also had perianal discomfort and skin changes and this will be biopsied at the time.  She was advised that she may require overnight hospital stay, but this was not certain.  All questions were answered.

## 2021-01-28 ENCOUNTER — PATIENT EDUCATION (SURGERY INSTRUCTIONS) (OUTPATIENT)
Dept: GYNECOLOGIC ONCOLOGY | Facility: CLINIC | Age: 71
End: 2021-01-28

## 2021-01-28 ENCOUNTER — TELEPHONE (OUTPATIENT)
Dept: GYNECOLOGIC ONCOLOGY | Facility: CLINIC | Age: 71
End: 2021-01-28

## 2021-01-28 PROBLEM — C51.8: Status: ACTIVE | Noted: 2021-01-28

## 2021-01-28 NOTE — TELEPHONE ENCOUNTER
I called patient and went over surgery times and instructions.     She verbalized understanding and will call if she needs anything.

## 2021-01-28 NOTE — PATIENT INSTRUCTIONS
Surgery Instructions            Lee Ann Cullen  [id number]  1950      SURGEON:  Mikaela Mccauley MD    Surgery Coordinator: Adalgisa   If you have any questions before or after surgery please call.  [phone]       Appointment    1. You have COVID testing on 01/29/2021 at 10:30 am. This is located at [address] [place] [address] located in the lower level of the building ( basement ). Upon arrival please park in the designated parking spaces located to the left of the building. Once parked, please stay in your car and call [phone]. A member of the clinic will conduct your screening before leaving your vehicle.     2. You have pre-admission testing (PAT) appointment on 01/29/2021 at 09:00 am  You will need to be at hospital registration 10 minutes before that time. The registration department is located in the long hallway between the [facility] and [address] [place] [address]. You may enter through the main entrance of the [address] [place] [address].      3.  Your surgery has been scheduled on 02/01/2021 You will need to be at the [address] [place] [address] second floor surgery registration on that day at 6:00 am.    The Day(s) Before Surgery     ·  Nothing by mouth after midnight on 01/31/2021    · Plan to have someone take you home from the hospital.    · Do not use any products that contain nicotine or tobacco, such as cigarettes and e-cigarettes. These can delay healing after surgery. If you need help quitting, ask your health care provider.    ·  Do not take vitamins or aspirin one week before surgery ( if applicable).  On the morning of your surgery, you may take you prescription medications with a sip of water, unless told otherwise by your provider.  Bring them with you to the hospital (Diabetic patient should bring insulin if instructed by the managing physician). If you are taking a blood thinner ( Eliquis, Coumadin, Xarelto, Lovenox, Asprin, Heparin, etc.) please have the provider that manages this instruct you on when to  stop taking prior to surgery.     · If you are feeling sick, have a fever or cough and have seen your PCP let our office know 48 hours prior to surgery. It may be subject to rescheduling.            What can I expect after the procedure?    A normal length of stay for laparoscopic procedures can be same day or up to 23 hours.     After the procedure, it is common to have:  · Pain.  · Soreness and numbness in your incision areas.  · bleeding and discharge up to 6 weeks after surgery.  · Constipation.  · Swelling or bruising.  · Temporary change in bladder function.  · Feelings of sadness or other emotions.  · Small amounts of clear drainage from incisions  · If you are discharged with an abdominal binder, this is to be used as needed for incisional comfort. You may choose to discontinue use at any time.      Follow these instructions at home:  Medicines    · Please take any medications that have been prescribed after your surgery, you may take over the counter Tylenol and Ibuprofen, unless told otherwise by your provider.   · Please take your stool softener as prescribed. If you do not have a bowel movement within 24 hours following surgery try a laxative (milk of magnesia) or you may take Carli-Lax.    Activity    · Return to your normal activities as told by your health care provider.   · You may be told to take short walks every day and go farther each time.  · Do not lift anything that is heavier than 20 lbs.  · No squatting or baths. ( you may shower )       General instructions    · Do not put anything in your vagina for 6 weeks after your surgery or as told by your health care provider. This includes tampons and douches.  · Do not have sex until your health care provider says you can.  · Do not take baths, swim, or use a hot tub until your health care provider approves.  · Drink enough fluid to keep your urine clear or pale yellow.  · Do not drive for 24 hours if you were given a sedative.  · Do not drive while  taking Narcotic Pain medication ( oxycodone, hydrocodone, etc.).   · Keep all follow-up visits as told by your health care provider. This is important. You will have a post op appointment typically 3 weeks after surgery.  · Make sure you are urinating on a scheduled basis, for example every 2 hours. This will help retrain your bladder after surgery and prevent urinary tract infections.   · Use the clay-bottle when you urinate of have bowel movement. Keep the incision dry and clean at all times.     Contact a health care provider if:    · Your pain medicine is not helping.  · You have a fever over 101.0  · You have redness, swelling, or pain at your incision site.  · You continue to have difficulty urinating.  · You have not had a bowel movement 2-3 days after surgery, experience nausea and vomiting, are unable to pass gas.   · Large volumes of drainage or blood from incisions, requiring multiple guaze changes.     Get help right away if:    · You have severe abdominal or back pain that is not controlled with medication.  · You have heavy bleeding from your vagina that saturates a maxi pad within 1-2 hours. Note- vaginal bleeding and spotting is normal up to 6 weeks from a surgery. Heavy Bleeding is not.     If you experience a medical emergency call 911 or have someone drive you to your nearest emergency department.

## 2021-01-29 ENCOUNTER — HOSPITAL ENCOUNTER (OUTPATIENT)
Dept: GENERAL RADIOLOGY | Facility: HOSPITAL | Age: 71
Discharge: HOME | End: 2021-01-29

## 2021-01-29 ENCOUNTER — APPOINTMENT (OUTPATIENT)
Dept: PREADMISSION TESTING | Facility: HOSPITAL | Age: 71
End: 2021-01-29

## 2021-01-29 VITALS — HEIGHT: 66 IN | BODY MASS INDEX: 24.53 KG/M2 | WEIGHT: 152.6 LBS

## 2021-01-29 DIAGNOSIS — C51.9 VULVAR CANCER (HCC): ICD-10-CM

## 2021-01-29 DIAGNOSIS — C51.8 MALIGNANT NEOPLASM OF OVERLAPPING SITES OF VULVA (HCC): ICD-10-CM

## 2021-01-29 LAB
ABO GROUP BLD: NORMAL
ALBUMIN SERPL-MCNC: 4.3 G/DL (ref 3.5–5.2)
ALBUMIN/GLOB SERPL: 1.2 G/DL
ALP SERPL-CCNC: 66 U/L (ref 39–117)
ALT SERPL W P-5'-P-CCNC: 20 U/L (ref 1–33)
ANION GAP SERPL CALCULATED.3IONS-SCNC: 11 MMOL/L (ref 5–15)
AST SERPL-CCNC: 26 U/L (ref 1–32)
BASOPHILS # BLD AUTO: 0.03 10*3/MM3 (ref 0–0.2)
BASOPHILS NFR BLD AUTO: 0.5 % (ref 0–1.5)
BILIRUB SERPL-MCNC: 1 MG/DL (ref 0–1.2)
BLD GP AB SCN SERPL QL: NEGATIVE
BUN SERPL-MCNC: 19 MG/DL (ref 8–23)
BUN/CREAT SERPL: 18.4 (ref 7–25)
CALCIUM SPEC-SCNC: 10 MG/DL (ref 8.6–10.5)
CHLORIDE SERPL-SCNC: 104 MMOL/L (ref 98–107)
CO2 SERPL-SCNC: 24 MMOL/L (ref 22–29)
CREAT SERPL-MCNC: 1.03 MG/DL (ref 0.57–1)
DEPRECATED RDW RBC AUTO: 41.1 FL (ref 37–54)
EOSINOPHIL # BLD AUTO: 0.09 10*3/MM3 (ref 0–0.4)
EOSINOPHIL NFR BLD AUTO: 1.6 % (ref 0.3–6.2)
ERYTHROCYTE [DISTWIDTH] IN BLOOD BY AUTOMATED COUNT: 12.5 % (ref 12.3–15.4)
GFR SERPL CREATININE-BSD FRML MDRD: 53 ML/MIN/1.73
GLOBULIN UR ELPH-MCNC: 3.7 GM/DL
GLUCOSE SERPL-MCNC: 97 MG/DL (ref 65–99)
HBA1C MFR BLD: 5.3 % (ref 4.8–5.6)
HCT VFR BLD AUTO: 43.1 % (ref 34–46.6)
HGB BLD-MCNC: 14.7 G/DL (ref 12–15.9)
IMM GRANULOCYTES # BLD AUTO: 0.01 10*3/MM3 (ref 0–0.05)
IMM GRANULOCYTES NFR BLD AUTO: 0.2 % (ref 0–0.5)
LYMPHOCYTES # BLD AUTO: 1.56 10*3/MM3 (ref 0.7–3.1)
LYMPHOCYTES NFR BLD AUTO: 27.7 % (ref 19.6–45.3)
MCH RBC QN AUTO: 30.8 PG (ref 26.6–33)
MCHC RBC AUTO-ENTMCNC: 34.1 G/DL (ref 31.5–35.7)
MCV RBC AUTO: 90.4 FL (ref 79–97)
MONOCYTES # BLD AUTO: 0.47 10*3/MM3 (ref 0.1–0.9)
MONOCYTES NFR BLD AUTO: 8.3 % (ref 5–12)
NEUTROPHILS NFR BLD AUTO: 3.47 10*3/MM3 (ref 1.7–7)
NEUTROPHILS NFR BLD AUTO: 61.7 % (ref 42.7–76)
NRBC BLD AUTO-RTO: 0 /100 WBC (ref 0–0.2)
PLATELET # BLD AUTO: 442 10*3/MM3 (ref 140–450)
PMV BLD AUTO: 8.6 FL (ref 6–12)
POTASSIUM SERPL-SCNC: 4 MMOL/L (ref 3.5–5.2)
PROT SERPL-MCNC: 8 G/DL (ref 6–8.5)
RBC # BLD AUTO: 4.77 10*6/MM3 (ref 3.77–5.28)
RH BLD: POSITIVE
SODIUM SERPL-SCNC: 139 MMOL/L (ref 136–145)
T&S EXPIRATION DATE: NORMAL
WBC # BLD AUTO: 5.63 10*3/MM3 (ref 3.4–10.8)

## 2021-01-29 PROCEDURE — 36415 COLL VENOUS BLD VENIPUNCTURE: CPT

## 2021-01-29 PROCEDURE — 86850 RBC ANTIBODY SCREEN: CPT

## 2021-01-29 PROCEDURE — 85025 COMPLETE CBC W/AUTO DIFF WBC: CPT

## 2021-01-29 PROCEDURE — 80053 COMPREHEN METABOLIC PANEL: CPT

## 2021-01-29 PROCEDURE — C9803 HOPD COVID-19 SPEC COLLECT: HCPCS

## 2021-01-29 PROCEDURE — U0004 COV-19 TEST NON-CDC HGH THRU: HCPCS

## 2021-01-29 PROCEDURE — 71046 X-RAY EXAM CHEST 2 VIEWS: CPT

## 2021-01-29 PROCEDURE — 86901 BLOOD TYPING SEROLOGIC RH(D): CPT

## 2021-01-29 PROCEDURE — 83036 HEMOGLOBIN GLYCOSYLATED A1C: CPT

## 2021-01-29 PROCEDURE — 86900 BLOOD TYPING SEROLOGIC ABO: CPT

## 2021-01-30 LAB — SARS-COV-2 RNA RESP QL NAA+PROBE: NOT DETECTED

## 2021-01-31 ENCOUNTER — ANESTHESIA EVENT (OUTPATIENT)
Dept: PERIOP | Facility: HOSPITAL | Age: 71
End: 2021-01-31

## 2021-01-31 RX ORDER — SODIUM CHLORIDE, SODIUM LACTATE, POTASSIUM CHLORIDE, CALCIUM CHLORIDE 600; 310; 30; 20 MG/100ML; MG/100ML; MG/100ML; MG/100ML
9 INJECTION, SOLUTION INTRAVENOUS CONTINUOUS
Status: CANCELLED | OUTPATIENT
Start: 2021-01-31

## 2021-01-31 RX ORDER — FAMOTIDINE 10 MG/ML
20 INJECTION, SOLUTION INTRAVENOUS ONCE
Status: CANCELLED | OUTPATIENT
Start: 2021-01-31 | End: 2021-01-31

## 2021-02-01 ENCOUNTER — ANESTHESIA (OUTPATIENT)
Dept: PERIOP | Facility: HOSPITAL | Age: 71
End: 2021-02-01

## 2021-02-01 ENCOUNTER — HOSPITAL ENCOUNTER (OUTPATIENT)
Facility: HOSPITAL | Age: 71
LOS: 1 days | Discharge: HOME | End: 2021-02-02
Attending: OBSTETRICS & GYNECOLOGY | Admitting: OBSTETRICS & GYNECOLOGY

## 2021-02-01 DIAGNOSIS — C51.8 MALIGNANT NEOPLASM OF OVERLAPPING SITES OF VULVA (HCC): ICD-10-CM

## 2021-02-01 DIAGNOSIS — C51.9 VULVAR CANCER (HCC): ICD-10-CM

## 2021-02-01 PROCEDURE — 88304 TISSUE EXAM BY PATHOLOGIST: CPT | Performed by: OBSTETRICS & GYNECOLOGY

## 2021-02-01 PROCEDURE — 88305 TISSUE EXAM BY PATHOLOGIST: CPT | Performed by: OBSTETRICS & GYNECOLOGY

## 2021-02-01 PROCEDURE — 25010000002 MIDAZOLAM PER 1 MG: Performed by: ANESTHESIOLOGY

## 2021-02-01 PROCEDURE — 25010000002 VANCOMYCIN PER 500 MG: Performed by: OBSTETRICS & GYNECOLOGY

## 2021-02-01 PROCEDURE — 25010000002 KETOROLAC TROMETHAMINE PER 15 MG: Performed by: NURSE ANESTHETIST, CERTIFIED REGISTERED

## 2021-02-01 PROCEDURE — 25010000002 PROPOFOL 10 MG/ML EMULSION: Performed by: NURSE ANESTHETIST, CERTIFIED REGISTERED

## 2021-02-01 PROCEDURE — 25010000002 DEXAMETHASONE PER 1 MG: Performed by: NURSE ANESTHETIST, CERTIFIED REGISTERED

## 2021-02-01 PROCEDURE — 25010000002 ONDANSETRON PER 1 MG: Performed by: NURSE ANESTHETIST, CERTIFIED REGISTERED

## 2021-02-01 PROCEDURE — G0378 HOSPITAL OBSERVATION PER HR: HCPCS

## 2021-02-01 PROCEDURE — 56630 VULVECTOMY RADICAL PARTIAL: CPT | Performed by: OBSTETRICS & GYNECOLOGY

## 2021-02-01 PROCEDURE — 25010000002 FENTANYL CITRATE (PF) 100 MCG/2ML SOLUTION: Performed by: NURSE ANESTHETIST, CERTIFIED REGISTERED

## 2021-02-01 PROCEDURE — 11421 EXC H-F-NK-SP B9+MARG 0.6-1: CPT | Performed by: OBSTETRICS & GYNECOLOGY

## 2021-02-01 RX ORDER — SODIUM CHLORIDE 0.9 % (FLUSH) 0.9 %
10 SYRINGE (ML) INJECTION EVERY 12 HOURS SCHEDULED
Status: DISCONTINUED | OUTPATIENT
Start: 2021-02-01 | End: 2021-02-01 | Stop reason: HOSPADM

## 2021-02-01 RX ORDER — NALOXONE HCL 0.4 MG/ML
0.4 VIAL (ML) INJECTION AS NEEDED
Status: DISCONTINUED | OUTPATIENT
Start: 2021-02-01 | End: 2021-02-01 | Stop reason: HOSPADM

## 2021-02-01 RX ORDER — VANCOMYCIN HYDROCHLORIDE 1 G/200ML
15 INJECTION, SOLUTION INTRAVENOUS ONCE
Status: COMPLETED | OUTPATIENT
Start: 2021-02-01 | End: 2021-02-01

## 2021-02-01 RX ORDER — NALOXONE HCL 0.4 MG/ML
0.4 VIAL (ML) INJECTION
Status: DISCONTINUED | OUTPATIENT
Start: 2021-02-01 | End: 2021-02-02

## 2021-02-01 RX ORDER — OXYCODONE HYDROCHLORIDE 5 MG/1
10 TABLET ORAL EVERY 4 HOURS PRN
Status: DISCONTINUED | OUTPATIENT
Start: 2021-02-01 | End: 2021-02-02

## 2021-02-01 RX ORDER — BUPIVACAINE HYDROCHLORIDE AND EPINEPHRINE 2.5; 5 MG/ML; UG/ML
INJECTION, SOLUTION INFILTRATION; PERINEURAL AS NEEDED
Status: DISCONTINUED | OUTPATIENT
Start: 2021-02-01 | End: 2021-02-01 | Stop reason: HOSPADM

## 2021-02-01 RX ORDER — MIDAZOLAM HYDROCHLORIDE 1 MG/ML
1 INJECTION INTRAMUSCULAR; INTRAVENOUS
Status: DISCONTINUED | OUTPATIENT
Start: 2021-02-01 | End: 2021-02-01 | Stop reason: HOSPADM

## 2021-02-01 RX ORDER — CELECOXIB 200 MG/1
200 CAPSULE ORAL ONCE
Status: COMPLETED | OUTPATIENT
Start: 2021-02-01 | End: 2021-02-01

## 2021-02-01 RX ORDER — HYDROCODONE BITARTRATE AND ACETAMINOPHEN 5; 325 MG/1; MG/1
1 TABLET ORAL ONCE AS NEEDED
Status: DISCONTINUED | OUTPATIENT
Start: 2021-02-01 | End: 2021-02-01 | Stop reason: HOSPADM

## 2021-02-01 RX ORDER — LEVOTHYROXINE SODIUM 112 UG/1
56 TABLET ORAL
Status: DISCONTINUED | OUTPATIENT
Start: 2021-02-02 | End: 2021-02-02 | Stop reason: HOSPADM

## 2021-02-01 RX ORDER — SODIUM CHLORIDE 0.9 % (FLUSH) 0.9 %
3-10 SYRINGE (ML) INJECTION AS NEEDED
Status: DISCONTINUED | OUTPATIENT
Start: 2021-02-01 | End: 2021-02-01 | Stop reason: HOSPADM

## 2021-02-01 RX ORDER — LIDOCAINE HYDROCHLORIDE 10 MG/ML
INJECTION, SOLUTION EPIDURAL; INFILTRATION; INTRACAUDAL; PERINEURAL AS NEEDED
Status: DISCONTINUED | OUTPATIENT
Start: 2021-02-01 | End: 2021-02-01 | Stop reason: SURG

## 2021-02-01 RX ORDER — KETOROLAC TROMETHAMINE 30 MG/ML
INJECTION, SOLUTION INTRAMUSCULAR; INTRAVENOUS AS NEEDED
Status: DISCONTINUED | OUTPATIENT
Start: 2021-02-01 | End: 2021-02-01 | Stop reason: SURG

## 2021-02-01 RX ORDER — SODIUM CHLORIDE 0.9 % (FLUSH) 0.9 %
10 SYRINGE (ML) INJECTION AS NEEDED
Status: DISCONTINUED | OUTPATIENT
Start: 2021-02-01 | End: 2021-02-01 | Stop reason: HOSPADM

## 2021-02-01 RX ORDER — ACETAMINOPHEN 325 MG/1
650 TABLET ORAL EVERY 6 HOURS
Status: DISCONTINUED | OUTPATIENT
Start: 2021-02-01 | End: 2021-02-02 | Stop reason: HOSPADM

## 2021-02-01 RX ORDER — SODIUM CHLORIDE, SODIUM LACTATE, POTASSIUM CHLORIDE, CALCIUM CHLORIDE 600; 310; 30; 20 MG/100ML; MG/100ML; MG/100ML; MG/100ML
75 INJECTION, SOLUTION INTRAVENOUS CONTINUOUS
Status: DISCONTINUED | OUTPATIENT
Start: 2021-02-01 | End: 2021-02-02

## 2021-02-01 RX ORDER — ATORVASTATIN CALCIUM 20 MG/1
20 TABLET, FILM COATED ORAL NIGHTLY
Status: DISCONTINUED | OUTPATIENT
Start: 2021-02-01 | End: 2021-02-02 | Stop reason: HOSPADM

## 2021-02-01 RX ORDER — EPHEDRINE SULFATE 50 MG/ML
INJECTION, SOLUTION INTRAVENOUS AS NEEDED
Status: DISCONTINUED | OUTPATIENT
Start: 2021-02-01 | End: 2021-02-01 | Stop reason: SURG

## 2021-02-01 RX ORDER — HYDROMORPHONE HYDROCHLORIDE 1 MG/ML
0.5 INJECTION, SOLUTION INTRAMUSCULAR; INTRAVENOUS; SUBCUTANEOUS
Status: DISCONTINUED | OUTPATIENT
Start: 2021-02-01 | End: 2021-02-01 | Stop reason: SDUPTHER

## 2021-02-01 RX ORDER — FENTANYL CITRATE 50 UG/ML
50 INJECTION, SOLUTION INTRAMUSCULAR; INTRAVENOUS
Status: DISCONTINUED | OUTPATIENT
Start: 2021-02-01 | End: 2021-02-01 | Stop reason: HOSPADM

## 2021-02-01 RX ORDER — ONDANSETRON 4 MG/1
4 TABLET, FILM COATED ORAL EVERY 6 HOURS PRN
Status: DISCONTINUED | OUTPATIENT
Start: 2021-02-01 | End: 2021-02-02 | Stop reason: HOSPADM

## 2021-02-01 RX ORDER — ONDANSETRON 2 MG/ML
4 INJECTION INTRAMUSCULAR; INTRAVENOUS EVERY 6 HOURS PRN
Status: DISCONTINUED | OUTPATIENT
Start: 2021-02-01 | End: 2021-02-02

## 2021-02-01 RX ORDER — FENTANYL CITRATE 50 UG/ML
INJECTION, SOLUTION INTRAMUSCULAR; INTRAVENOUS AS NEEDED
Status: DISCONTINUED | OUTPATIENT
Start: 2021-02-01 | End: 2021-02-01 | Stop reason: SURG

## 2021-02-01 RX ORDER — MIDAZOLAM HYDROCHLORIDE 1 MG/ML
0.5 INJECTION INTRAMUSCULAR; INTRAVENOUS
Status: DISCONTINUED | OUTPATIENT
Start: 2021-02-01 | End: 2021-02-01 | Stop reason: HOSPADM

## 2021-02-01 RX ORDER — IPRATROPIUM BROMIDE AND ALBUTEROL SULFATE 2.5; .5 MG/3ML; MG/3ML
3 SOLUTION RESPIRATORY (INHALATION) ONCE AS NEEDED
Status: DISCONTINUED | OUTPATIENT
Start: 2021-02-01 | End: 2021-02-01 | Stop reason: HOSPADM

## 2021-02-01 RX ORDER — SODIUM CHLORIDE 9 MG/ML
9 INJECTION, SOLUTION INTRAVENOUS CONTINUOUS PRN
Status: DISCONTINUED | OUTPATIENT
Start: 2021-02-01 | End: 2021-02-01 | Stop reason: HOSPADM

## 2021-02-01 RX ORDER — FAMOTIDINE 20 MG/1
20 TABLET, FILM COATED ORAL ONCE
Status: COMPLETED | OUTPATIENT
Start: 2021-02-01 | End: 2021-02-01

## 2021-02-01 RX ORDER — IBUPROFEN 600 MG/1
600 TABLET ORAL EVERY 6 HOURS PRN
Status: DISCONTINUED | OUTPATIENT
Start: 2021-02-01 | End: 2021-02-02 | Stop reason: HOSPADM

## 2021-02-01 RX ORDER — DROPERIDOL 2.5 MG/ML
0.62 INJECTION, SOLUTION INTRAMUSCULAR; INTRAVENOUS ONCE AS NEEDED
Status: DISCONTINUED | OUTPATIENT
Start: 2021-02-01 | End: 2021-02-01 | Stop reason: HOSPADM

## 2021-02-01 RX ORDER — ONDANSETRON 2 MG/ML
4 INJECTION INTRAMUSCULAR; INTRAVENOUS ONCE AS NEEDED
Status: DISCONTINUED | OUTPATIENT
Start: 2021-02-01 | End: 2021-02-01 | Stop reason: HOSPADM

## 2021-02-01 RX ORDER — MAGNESIUM HYDROXIDE 1200 MG/15ML
LIQUID ORAL AS NEEDED
Status: DISCONTINUED | OUTPATIENT
Start: 2021-02-01 | End: 2021-02-01 | Stop reason: HOSPADM

## 2021-02-01 RX ORDER — ONDANSETRON 2 MG/ML
INJECTION INTRAMUSCULAR; INTRAVENOUS AS NEEDED
Status: DISCONTINUED | OUTPATIENT
Start: 2021-02-01 | End: 2021-02-01 | Stop reason: SURG

## 2021-02-01 RX ORDER — SODIUM CHLORIDE 0.9 % (FLUSH) 0.9 %
3 SYRINGE (ML) INJECTION EVERY 12 HOURS SCHEDULED
Status: DISCONTINUED | OUTPATIENT
Start: 2021-02-01 | End: 2021-02-01 | Stop reason: HOSPADM

## 2021-02-01 RX ORDER — PROPOFOL 10 MG/ML
VIAL (ML) INTRAVENOUS AS NEEDED
Status: DISCONTINUED | OUTPATIENT
Start: 2021-02-01 | End: 2021-02-01 | Stop reason: SURG

## 2021-02-01 RX ORDER — DEXAMETHASONE SODIUM PHOSPHATE 4 MG/ML
INJECTION, SOLUTION INTRA-ARTICULAR; INTRALESIONAL; INTRAMUSCULAR; INTRAVENOUS; SOFT TISSUE AS NEEDED
Status: DISCONTINUED | OUTPATIENT
Start: 2021-02-01 | End: 2021-02-01 | Stop reason: SURG

## 2021-02-01 RX ORDER — HYDROMORPHONE HYDROCHLORIDE 1 MG/ML
0.5 INJECTION, SOLUTION INTRAMUSCULAR; INTRAVENOUS; SUBCUTANEOUS
Status: DISCONTINUED | OUTPATIENT
Start: 2021-02-01 | End: 2021-02-02

## 2021-02-01 RX ORDER — OXYCODONE HYDROCHLORIDE 5 MG/1
5 TABLET ORAL EVERY 4 HOURS PRN
Status: DISCONTINUED | OUTPATIENT
Start: 2021-02-01 | End: 2021-02-02 | Stop reason: HOSPADM

## 2021-02-01 RX ORDER — NALOXONE HCL 0.4 MG/ML
0.1 VIAL (ML) INJECTION
Status: DISCONTINUED | OUTPATIENT
Start: 2021-02-01 | End: 2021-02-01 | Stop reason: SDUPTHER

## 2021-02-01 RX ORDER — HYDROMORPHONE HYDROCHLORIDE 1 MG/ML
0.5 INJECTION, SOLUTION INTRAMUSCULAR; INTRAVENOUS; SUBCUTANEOUS
Status: DISCONTINUED | OUTPATIENT
Start: 2021-02-01 | End: 2021-02-01 | Stop reason: HOSPADM

## 2021-02-01 RX ORDER — CEFAZOLIN SODIUM 2 G/100ML
2 INJECTION, SOLUTION INTRAVENOUS ONCE
Status: DISCONTINUED | OUTPATIENT
Start: 2021-02-01 | End: 2021-02-01

## 2021-02-01 RX ORDER — DROPERIDOL 2.5 MG/ML
0.62 INJECTION, SOLUTION INTRAMUSCULAR; INTRAVENOUS AS NEEDED
Status: DISCONTINUED | OUTPATIENT
Start: 2021-02-01 | End: 2021-02-01 | Stop reason: HOSPADM

## 2021-02-01 RX ORDER — ACETAMINOPHEN 325 MG/1
650 TABLET ORAL ONCE
Status: COMPLETED | OUTPATIENT
Start: 2021-02-01 | End: 2021-02-01

## 2021-02-01 RX ORDER — PANTOPRAZOLE SODIUM 40 MG/1
40 TABLET, DELAYED RELEASE ORAL DAILY
Status: DISCONTINUED | OUTPATIENT
Start: 2021-02-01 | End: 2021-02-02 | Stop reason: HOSPADM

## 2021-02-01 RX ORDER — LIDOCAINE HYDROCHLORIDE 10 MG/ML
0.5 INJECTION, SOLUTION EPIDURAL; INFILTRATION; INTRACAUDAL; PERINEURAL ONCE AS NEEDED
Status: COMPLETED | OUTPATIENT
Start: 2021-02-01 | End: 2021-02-01

## 2021-02-01 RX ADMIN — ONDANSETRON 4 MG: 2 INJECTION INTRAMUSCULAR; INTRAVENOUS at 10:21

## 2021-02-01 RX ADMIN — OXYCODONE 5 MG: 5 TABLET ORAL at 19:59

## 2021-02-01 RX ADMIN — EPHEDRINE SULFATE 10 MG: 50 INJECTION, SOLUTION INTRAVENOUS at 10:00

## 2021-02-01 RX ADMIN — FENTANYL CITRATE 50 MCG: 50 INJECTION, SOLUTION INTRAMUSCULAR; INTRAVENOUS at 10:18

## 2021-02-01 RX ADMIN — LIDOCAINE HYDROCHLORIDE 0.2 ML: 10 INJECTION, SOLUTION EPIDURAL; INFILTRATION; INTRACAUDAL; PERINEURAL at 07:37

## 2021-02-01 RX ADMIN — SODIUM CHLORIDE 9 ML/HR: 9 INJECTION, SOLUTION INTRAVENOUS at 07:37

## 2021-02-01 RX ADMIN — KETOROLAC TROMETHAMINE 15 MG: 30 INJECTION, SOLUTION INTRAMUSCULAR; INTRAVENOUS at 10:24

## 2021-02-01 RX ADMIN — CELECOXIB 200 MG: 200 CAPSULE ORAL at 07:57

## 2021-02-01 RX ADMIN — ACETAMINOPHEN 650 MG: 325 TABLET ORAL at 07:57

## 2021-02-01 RX ADMIN — DEXAMETHASONE SODIUM PHOSPHATE 8 MG: 4 INJECTION, SOLUTION INTRA-ARTICULAR; INTRALESIONAL; INTRAMUSCULAR; INTRAVENOUS; SOFT TISSUE at 09:35

## 2021-02-01 RX ADMIN — SODIUM CHLORIDE, POTASSIUM CHLORIDE, SODIUM LACTATE AND CALCIUM CHLORIDE 75 ML/HR: 600; 310; 30; 20 INJECTION, SOLUTION INTRAVENOUS at 11:25

## 2021-02-01 RX ADMIN — FAMOTIDINE 20 MG: 20 TABLET, FILM COATED ORAL at 07:57

## 2021-02-01 RX ADMIN — ACETAMINOPHEN 650 MG: 325 TABLET, FILM COATED ORAL at 19:58

## 2021-02-01 RX ADMIN — FENTANYL CITRATE 50 MCG: 50 INJECTION, SOLUTION INTRAMUSCULAR; INTRAVENOUS at 09:27

## 2021-02-01 RX ADMIN — PROPOFOL 150 MG: 10 INJECTION, EMULSION INTRAVENOUS at 09:27

## 2021-02-01 RX ADMIN — VANCOMYCIN HYDROCHLORIDE 1000 MG: 1 INJECTION, SOLUTION INTRAVENOUS at 08:28

## 2021-02-01 RX ADMIN — PANTOPRAZOLE SODIUM 40 MG: 40 TABLET, DELAYED RELEASE ORAL at 14:13

## 2021-02-01 RX ADMIN — LIDOCAINE HYDROCHLORIDE 50 MG: 10 INJECTION, SOLUTION EPIDURAL; INFILTRATION; INTRACAUDAL; PERINEURAL at 09:27

## 2021-02-01 RX ADMIN — ACETAMINOPHEN 650 MG: 325 TABLET, FILM COATED ORAL at 14:13

## 2021-02-01 RX ADMIN — MIDAZOLAM 2 MG: 1 INJECTION INTRAMUSCULAR; INTRAVENOUS at 09:20

## 2021-02-01 RX ADMIN — ATORVASTATIN CALCIUM 20 MG: 20 TABLET, FILM COATED ORAL at 20:02

## 2021-02-01 NOTE — INTERVAL H&P NOTE
"[facility] Pre-op    Full history and physical note from office is attached.    /76 (BP Location: Right arm, Patient Position: Lying)   Pulse 65   Temp 97.2 °F (36.2 °C) (Temporal)   Resp 16   Ht 167.6 cm (66\")   Wt 68.9 kg (152 lb)   SpO2 100%   BMI 24.53 kg/m²     Immunizations:  Influenza:  No  Pneumococcal:  No  Tetanus:  UTD    LAB Results:  Lab Results   Component Value Date    WBC 5.63 01/29/2021    HGB 14.7 01/29/2021    HCT 43.1 01/29/2021    MCV 90.4 01/29/2021     01/29/2021    NEUTROABS 3.47 01/29/2021    GLUCOSE 97 01/29/2021    BUN 19 01/29/2021    CREATININE 1.03 (H) 01/29/2021    EGFRIFNONA 53 (L) 01/29/2021     01/29/2021    K 4.0 01/29/2021     01/29/2021    CO2 24.0 01/29/2021    MG 1.1 (L) 07/25/2014    PHOS 2.0 (L) 07/14/2014    CALCIUM 10.0 01/29/2021    ALBUMIN 4.30 01/29/2021    AST 26 01/29/2021    ALT 20 01/29/2021    BILITOT 1.0 01/29/2021       Cancer Staging (if applicable)  Cancer Patient: _x_ yes __no __unknown__N/A; If yes, clinical stage T:__ N:__M:__, stage group or __N/A: Stage group N/A due to recurrent cancer status.      Impression: Recurrent vulvar cancer      Plan: MODIFIED RIGHT RADICAL VULVECTOMY, PERIANAL BIOPSY      Sharlene Manuel, JOHNNY   2/1/2021   08:11 EST     I saw and evaluated the patient. I agree with the findings and the plan of care as documented in the note.    Mikaela Mccauley MD  02/01/21  08:45 EST    "

## 2021-02-01 NOTE — PLAN OF CARE
Goal Outcome Evaluation:        Outcome Summary: VSS. Pt resting well. no c/o pain. Will continue to monitor

## 2021-02-01 NOTE — OP NOTE
Subjective     Date of Service:  02/01/21  Time of Service:  10:57 EST    Surgical Staff: Surgeon(s) and Role:     * Mikaela Mccauley MD - Primary     * Silvana Alfonso MD - Resident - Assisting   Additional Staff:    Pre-operative diagnosis(es): Pre-Op Diagnosis Codes:     * Vulvar cancer (CMS/HCC) [C51.9]     * Malignant neoplasm of overlapping sites of vulva (CMS/HCC) [C51.8]  Recurrent vulvar cancer     Post-operative diagnosis(es): Post-Op Diagnosis Codes:     * Vulvar cancer (CMS/HCC) [C51.9]     * Malignant neoplasm of overlapping sites of vulva (CMS/HCC) [C51.8]  Recurrent vulvar cancer   Procedure(s): Procedure(s):  WIDE LOCAL EXCISION 3CM  RIGHT PERIUREATHRAL LEASION,  MODIFIED RIGHT RADICAL VULVECTOMY OF 3 CM SCAR WITH TUMOR,  PERIANAL BIOPSY     Antibiotics:  Vancomycin ordered on call to OR     Anesthesia: Type: General  ASA:  II     Objective      Operative findings:  At the time of exam under anesthesia, verrucoid lesion immediately adjacent to the right aspect of the urethra was identified.  This extended anteriorly and posteriorly.  Pre-existing scar was identified and this had what was thought to be an area of visible tumor.  Both of these lesions were 3 cm in dimension.  No perianal skin lesion was identified.  The vulva, vagina, and perianal areas were all remarkable for chronic skin changes consistent with prior radiation.   Specimens removed: ID Type Source Tests Collected by Time   A (Not marked as sent) : medial vulvular lesion  Tissue Vulva TISSUE PATHOLOGY EXAM Mikaela Mccauley MD 2/1/2021 1001   B (Not marked as sent) : lateral vulvular lesion Tissue Vulva TISSUE PATHOLOGY EXAM Mikaela Mccauley MD 2/1/2021 1019   C (Not marked as sent) : perianal biopsy  Tissue Anus TISSUE PATHOLOGY EXAM Mikaela Mccauley MD 2/1/2021 1028      Fluid Intake and Output: I/O this shift:  In: 400 [I.V.:400]  Out: 200 [Urine:150; Blood:50]   Blood products used: No   Drains: Urethral Catheter Silicone  16 Fr. (Active)      Implant Information: Nothing was implanted during the procedure   Complications:  No immediate   Intraoperative consult(s):    Condition: stable   Disposition: to PACU and then admit to  medical - surgical floor       Indications:  Patient's pleasant 70-year-old woman with a history of vulvar cancer with lymph node metastasis.  She underwent surgical excision and definitive treatment including radiation to bilateral inguinofemoral areas and pelvis.  She was noted to have a lesion and underwent biopsy which showed ulceration.  She underwent surgical intervention by Dr. Ulises Funes and surgical recurrence was identified within the specimen.  Margins were positive.  Perianal biopsy was planned due to patient's complaints of perianal irritation.  Risks and benefits of surgical intervention were discussed.  Consent was signed and on chart.    Procedure: After obtaining informed consent, patient was taken to the operating room and underwent general anesthesia after patient site verification.  Feet were placed in Yohan stirrups.  Inspection revealed the above findings.  Vagina, perineum, and surrounding areas were prepped and draped in the usual sterile fashion.  Marking pen was used outline areas of concern.  Quarter percent Marcaine with epinephrine was used to circumferentially inject the surgical field.  Scalpel was used to incise the skin at the right periurethral area of concern and wide local excision was performed using Bovie electrocautery.  Adjacent 3 cm scar with what was thought to be visible tumor was identified and outlined with a marking pen.  This was excised in a similar fashion except hand-held harmonic was used to extend the deep part of the excision including a portion of the underlying muscle and this excision was more consistent with a modified radical vulvectomy.  Specimens were put on a orientation board and sent to pathology for orientation.  Surgical field was copiously  irrigated and aspirated.  Bovie electrocautery was used to obtain additional hemostasis.  2-0 Vicryl suture was used in interrupted horizontal mattress to reapproximate the posterior aspect of the defect.  A single 2-0 Vicryl suture x1 was used at the most anterior aspect of the defect in a horizontal mattress fashion.  2-0 Vicryl was used to secure the skin to the cut bulbocavernosus muscle on the right in interrupted fashion x1.  Aponte catheter had been placed during the procedure.  There was concern that any additional suture could lead to stricture and worsening urinary dysfunction.  Biopsy was performed with scalpel of the perianal area.  No lesion was noted in this area.  Hemostasis was achieved with Bovie electrocautery.  Good hemostasis was noted.  All counts were correct.  Silvadene cream, dressing, and ice pack were placed.  Patient was taken to the recovery room in good condition.      Mikaela Mccauley MD  02/01/21  10:57 EST

## 2021-02-02 VITALS
RESPIRATION RATE: 18 BRPM | DIASTOLIC BLOOD PRESSURE: 54 MMHG | SYSTOLIC BLOOD PRESSURE: 139 MMHG | TEMPERATURE: 97.8 F | HEART RATE: 59 BPM | WEIGHT: 152 LBS | HEIGHT: 66 IN | BODY MASS INDEX: 24.43 KG/M2 | OXYGEN SATURATION: 97 %

## 2021-02-02 LAB
ANION GAP SERPL CALCULATED.3IONS-SCNC: 14 MMOL/L (ref 5–15)
BASOPHILS # BLD AUTO: 0.01 10*3/MM3 (ref 0–0.2)
BASOPHILS NFR BLD AUTO: 0.1 % (ref 0–1.5)
BUN SERPL-MCNC: 13 MG/DL (ref 8–23)
BUN/CREAT SERPL: 14.6 (ref 7–25)
CALCIUM SPEC-SCNC: 8.9 MG/DL (ref 8.6–10.5)
CHLORIDE SERPL-SCNC: 107 MMOL/L (ref 98–107)
CO2 SERPL-SCNC: 15 MMOL/L (ref 22–29)
CREAT SERPL-MCNC: 0.89 MG/DL (ref 0.57–1)
DEPRECATED RDW RBC AUTO: 42.6 FL (ref 37–54)
EOSINOPHIL # BLD AUTO: 0 10*3/MM3 (ref 0–0.4)
EOSINOPHIL NFR BLD AUTO: 0 % (ref 0.3–6.2)
ERYTHROCYTE [DISTWIDTH] IN BLOOD BY AUTOMATED COUNT: 12.4 % (ref 12.3–15.4)
GFR SERPL CREATININE-BSD FRML MDRD: 63 ML/MIN/1.73
GLUCOSE SERPL-MCNC: 80 MG/DL (ref 65–99)
HCT VFR BLD AUTO: 41 % (ref 34–46.6)
HGB BLD-MCNC: 13.2 G/DL (ref 12–15.9)
IMM GRANULOCYTES # BLD AUTO: 0.03 10*3/MM3 (ref 0–0.05)
IMM GRANULOCYTES NFR BLD AUTO: 0.3 % (ref 0–0.5)
LYMPHOCYTES # BLD AUTO: 1.17 10*3/MM3 (ref 0.7–3.1)
LYMPHOCYTES NFR BLD AUTO: 12.6 % (ref 19.6–45.3)
MCH RBC QN AUTO: 29.9 PG (ref 26.6–33)
MCHC RBC AUTO-ENTMCNC: 32.2 G/DL (ref 31.5–35.7)
MCV RBC AUTO: 93 FL (ref 79–97)
MONOCYTES # BLD AUTO: 0.7 10*3/MM3 (ref 0.1–0.9)
MONOCYTES NFR BLD AUTO: 7.6 % (ref 5–12)
NEUTROPHILS NFR BLD AUTO: 7.36 10*3/MM3 (ref 1.7–7)
NEUTROPHILS NFR BLD AUTO: 79.4 % (ref 42.7–76)
NRBC BLD AUTO-RTO: 0 /100 WBC (ref 0–0.2)
PLATELET # BLD AUTO: 364 10*3/MM3 (ref 140–450)
PMV BLD AUTO: 8.8 FL (ref 6–12)
POTASSIUM SERPL-SCNC: 3.9 MMOL/L (ref 3.5–5.2)
RBC # BLD AUTO: 4.41 10*6/MM3 (ref 3.77–5.28)
SODIUM SERPL-SCNC: 136 MMOL/L (ref 136–145)
WBC # BLD AUTO: 9.27 10*3/MM3 (ref 3.4–10.8)

## 2021-02-02 PROCEDURE — G0378 HOSPITAL OBSERVATION PER HR: HCPCS

## 2021-02-02 PROCEDURE — 80048 BASIC METABOLIC PNL TOTAL CA: CPT | Performed by: STUDENT IN AN ORGANIZED HEALTH CARE EDUCATION/TRAINING PROGRAM

## 2021-02-02 PROCEDURE — 97161 PT EVAL LOW COMPLEX 20 MIN: CPT | Performed by: PHYSICAL THERAPIST

## 2021-02-02 PROCEDURE — 85025 COMPLETE CBC W/AUTO DIFF WBC: CPT | Performed by: STUDENT IN AN ORGANIZED HEALTH CARE EDUCATION/TRAINING PROGRAM

## 2021-02-02 RX ORDER — OXYCODONE HYDROCHLORIDE 5 MG/1
5 TABLET ORAL EVERY 4 HOURS PRN
Qty: 10 TABLET | Refills: 0 | Status: SHIPPED | OUTPATIENT
Start: 2021-02-02 | End: 2021-02-10

## 2021-02-02 RX ORDER — IBUPROFEN 600 MG/1
600 TABLET ORAL EVERY 6 HOURS PRN
Qty: 60 TABLET | Refills: 9 | Status: SHIPPED | OUTPATIENT
Start: 2021-02-02 | End: 2021-06-09

## 2021-02-02 RX ORDER — ACETAMINOPHEN 325 MG/1
650 TABLET ORAL EVERY 6 HOURS
Qty: 45 TABLET | Refills: 0 | Status: SHIPPED | OUTPATIENT
Start: 2021-02-02 | End: 2021-02-10

## 2021-02-02 RX ORDER — ONDANSETRON 4 MG/1
4 TABLET, FILM COATED ORAL EVERY 6 HOURS PRN
Qty: 10 TABLET | Refills: 2 | Status: SHIPPED | OUTPATIENT
Start: 2021-02-02 | End: 2021-02-10

## 2021-02-02 RX ADMIN — LEVOTHYROXINE SODIUM 56 MCG: 112 TABLET ORAL at 05:51

## 2021-02-02 RX ADMIN — ACETAMINOPHEN 650 MG: 325 TABLET, FILM COATED ORAL at 08:57

## 2021-02-02 RX ADMIN — ACETAMINOPHEN 650 MG: 325 TABLET, FILM COATED ORAL at 02:41

## 2021-02-02 RX ADMIN — PANTOPRAZOLE SODIUM 40 MG: 40 TABLET, DELAYED RELEASE ORAL at 08:57

## 2021-02-02 RX ADMIN — ACETAMINOPHEN 650 MG: 325 TABLET, FILM COATED ORAL at 13:37

## 2021-02-02 NOTE — THERAPY WOUND CARE TREATMENT
"Acute Care - Wound/Debridement Initial Evaluation  [facility]     Patient Name: Lee Ann Cullen  : 1950  MRN: [id number]  Today's Date: 2021  Onset of Illness/Injury or Date of Surgery: 21      Referring Physician: Dr Mccauley      Admit Date: 2021    Visit Dx:    ICD-10-CM ICD-9-CM   1. Vulvar cancer (CMS/HCC)  C51.9 184.4   2. Malignant neoplasm of overlapping sites of vulva (CMS/HCC)  C51.8 184.8       Patient Active Problem List   Diagnosis   • Vulvar cancer (CMS/HCC)   • Lichen sclerosus of female genitalia   • Screening for colon cancer   • Essential hypertension   • Gastroesophageal reflux disease without esophagitis   • Hypercholesterolemia   • Hypothyroidism   • Recurrent vulvar cancer (CMS/HCC)   • Malignant neoplasm of overlapping sites of vulva (CMS/HCC)        Past Medical History:   Diagnosis Date   • Arthritis    • Bilateral cataracts    • Depression    • Frequent UTI    • GERD (gastroesophageal reflux disease)    • History of basal cell carcinoma     nose   • History of methicillin resistant staphylococcus aureus (MRSA)     \"many years ago\"   • HTN (hypertension)    • Hyperlipidemia    • Hypothyroidism    • IC (interstitial cystitis)     managed by Dr. Funes   • Insomnia    • Lichen sclerosus of female genitalia    • MRSA (methicillin resistant staph aureus) culture positive    • Osteoporosis    • PONV (postoperative nausea and vomiting)    • Seasonal allergies    • Thyroid disease    • Vulvar cancer (CMS/HCC)         Past Surgical History:   Procedure Laterality Date   • APPENDECTOMY     • BACK SURGERY  2016   • CATARACT EXTRACTION Bilateral    • COLONOSCOPY     • CYSTOSCOPY BLADDER HYDRODISTENSION      with biopsies   • LAPAROSCOPIC CHOLECYSTECTOMY  2016   • OTHER SURGICAL HISTORY      microendoscopic disectomy   • PORTACATH PLACEMENT     • SKIN BIOPSY     • TOTAL ABDOMINAL HYSTERECTOMY WITH SALPINGO OOPHORECTOMY     • TOTAL KNEE ARTHROPLASTY Left    • VENOUS " ACCESS DEVICE (PORT) REMOVAL     • VULVECTOMY Left 04/2014    modified radical vulvectomy with left inguinofemoral LND   • VULVECTOMY Right 2/1/2021    Procedure: WIDE LOCAL EXCISION RIGHT PERIURETHRAL LESION, MODIFIED RIGHT RADICAL VULVECTOMY OF SCAR WITH TUMOR,  PERIANAL BIOPSY;  Surgeon: Mikaela Mccauley MD;  Location: [facility];  Service: Gynecology Oncology;  Laterality: Right;           Wound perineum Incision (Active)   Dressing Appearance moist drainage 02/02/21 0830   Closure Liquid skin adhesive 02/01/21 2000   Base moist;pink;red;subcutaneous 02/02/21 0830   Periwound redness;swelling;warm 02/02/21 0830   Periwound Temperature warm 02/02/21 0830   Periwound Skin Turgor soft 02/02/21 0830   Edges irregular;open 02/02/21 0830   Wound Length (cm) 3.5 cm 02/02/21 0830   Wound Width (cm) 1 cm 02/02/21 0830   Wound Depth (cm) 1.5 cm 02/02/21 0830   Drainage Characteristics/Odor serosanguineous 02/02/21 0830   Drainage Amount small 02/02/21 0830   Care, Wound cleansed with;wound cleanser 02/02/21 0830   Dressing Care dressing applied;antimicrobial agent applied;foam;abdominal pad;mesh panty 02/02/21 0830   Periwound Care cleansed with pH balanced cleanser;dry periwound area maintained 02/02/21 0830         WOUND DEBRIDEMENT                        PT Assessment (last 12 hours)      PT Evaluation and Treatment     Row Name 02/02/21 0830          Physical Therapy Time and Intention    Subjective Information  no complaints  -MW     Document Type  evaluation;therapy note (daily note);wound care  -MW     Mode of Treatment  individual therapy;physical therapy  -MW     Patient Effort  good  -MW     Row Name 02/02/21 0830          General Information    Patient Profile Reviewed  yes  -MW     Onset of Illness/Injury or Date of Surgery  02/01/21  -MW     Referring Physician  Dr Mccauley  -MW     Patient Observations  alert;cooperative;agree to therapy  -MW     General Observations of Patient  Sitting up in bed with  "breakfast tray.   -MW     Prior Level of Function  independent:;all household mobility  -MW     Risks Reviewed  patient:;increased discomfort  -MW     Benefits Reviewed  patient:;other (comment) improve healing, decrease risk of infx   -MW     Barriers to Rehab  medically complex  -MW     Row Name 02/02/21 0830          Cognition    Affect/Mental Status (Cognitive)  WNL  -MW     Orientation Status (Cognition)  oriented x 4  -MW     Follows Commands (Cognition)  WFL  -MW     Row Name 02/02/21 0830          Pain    Additional Documentation  Pain Scale: Numbers Pre/Post-Treatment (Group)  -MW     Row Name 02/02/21 0830          Pain Scale: Numbers Pre/Post-Treatment    Pretreatment Pain Rating  0/10 - no pain  -MW     Posttreatment Pain Rating  0/10 - no pain  -MW     Pain Location - Orientation  lower  -MW     Pain Location  abdomen  -MW     Pre/Posttreatment Pain Comment  \"feels like something inside\" pressure not pain   -MW     Row Name 02/02/21 0830          Bed Mobility    Comment (Bed Mobility)  good strength for bridging to place and remove pad and pull up briefs   -MW     Row Name 02/02/21 0830          Wound perineum Incision    Wound - Properties Group Location: perineum  -SM Primary Wound Type: Incision  -SM Additional Comments: ABD pad, gauze and surgical pants  -JR    Dressing Appearance  moist drainage on ABD pad, no packing present   -MW     Base  moist;pink;red;subcutaneous  -MW     Periwound  redness;swelling;warm  -MW     Periwound Temperature  warm  -MW     Periwound Skin Turgor  soft  -MW     Edges  irregular;open  -MW     Wound Length (cm)  3.5 cm  -MW     Wound Width (cm)  1 cm partially held closed with retention sutures   -MW     Wound Depth (cm)  1.5 cm  -MW     Drainage Characteristics/Odor  serosanguineous  -MW     Drainage Amount  small  -MW     Care, Wound  cleansed with;wound cleanser swabs   -MW     Dressing Care  dressing applied;antimicrobial agent applied;foam;abdominal pad;mesh panty " HFBc damp with saline, ABD pad, mesh briefs   -MW     Periwound Care  cleansed with pH balanced cleanser;dry periwound area maintained  -MW     Retired Wound - Properties Group Location: perineum  -SM Primary Wound Type: Incision  -SM Additional Comments: ABD pad, gauze and surgical pants  -JR    Row Name 02/02/21 0830          Coping    Observed Emotional State  cooperative;pleasant  -MW     Verbalized Emotional State  acceptance  -MW     Trust Relationship/Rapport  care explained;questions encouraged;questions answered;reassurance provided  -MW     Family/Support Persons  spouse  -MW     Involvement in Care  not present at bedside  -MW     Diversional Activities  television  -     Row Name 02/02/21 0830          Plan of Care Review    Plan of Care Reviewed With  patient  -MW     Progress  no change  -     Outcome Summary  PT wound care consult for dressings/ packing of vulvar area s/p resection of tumor and scar tissue. Wound with several retention sutures in place. Wound bed pink, red subq tissues. Gently packed area with HFBc damp with saline to improve flexibility. Covered with ABD pad. Advised pt to use peripad or maxi pad for improved comfort at home. Plan to review dressing placement with spouse prior to d/c. Change HFB packing prn displacement with voiding or pericare. 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   -     Row Name 02/02/21 0830          Physical Therapy Goals    Caregiver Training Goal Selection (PT)  caregiver training, PT goal 1  -     Wound Care Goal Selection (PT)  wound care, PT goal 1  -     Row Name 02/02/21 0830          Caregiver Training Goal 1 (PT)    Caregiver Training Goal 1 (PT)  Spouse independent with clean dressing change to promote wound closure.   -     Time Frame (Caregiver Training Goal 1, PT)  long-term goal (LTG);1 week  -     Row Name 02/02/21 0830          Wound Care Goal 1 (PT)    Wound Care Goal 1 (PT)  Vulvar wound with clean wound bed, decrease area  by 10%.   -MW     Time Frame (Wound Care Goal 1, PT)  long term goal (LTG);10 days  -MW     Barriers (Wound Care Goal 1, PT)  Previous radiation therapy   -MW     Row Name 02/02/21 0830          Therapy Assessment/Plan (PT)    Patient/Family Therapy Goals Statement (PT)  Go home   -MW     Rehab Potential (PT)  good, to achieve stated therapy goals  -MW     Criteria for Skilled Interventions Met (PT)  yes;meets criteria;skilled treatment is necessary  -MW     Row Name 02/02/21 0830          PT Evaluation Complexity    History, PT Evaluation Complexity  no personal factors and/or comorbidities  -MW     Examination of Body Systems (PT Eval Complexity)  1-2 elements  -MW     Clinical Presentation (PT Evaluation Complexity)  evolving  -MW     Clinical Decision Making (PT Evaluation Complexity)  low complexity  -MW     Overall Complexity (PT Evaluation Complexity)  low complexity  -     Row Name 02/02/21 0830          Therapy Plan Review/Discharge Plan (PT)    Therapy Plan Review (PT)  evaluation/treatment results reviewed;care plan/treatment goals reviewed;risks/benefits reviewed;current/potential barriers reviewed  -       User Key  (r) = Recorded By, (t) = Taken By, (c) = Cosigned By    Initials Name Provider Type    MW Luz Felton, PT Physical Therapist    Suzanne Boyd RN Registered Nurse    Beatriz Park RN Registered Nurse            Recommendation and Plan  Anticipated Discharge Disposition (PT): home with assist  Planned Therapy Interventions (PT): wound care, patient/family education  Plan of Care Reviewed With: patient   Progress: no change       Progress: no change  Outcome Summary: PT wound care consult for dressings/ packing of vulvar area s/p resection of tumor and scar tissue. Wound with several retention sutures in place. Wound bed pink, red subq tissues. Gently packed area with HFBc damp with saline to improve flexibility. Covered with ABD pad. Advised pt to use peripad or maxi pad for  improved comfort at home. Plan to review dressing placement with spouse prior to d/c. Change HFB packing prn displacement with voiding or pericare. 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   Plan of Care Reviewed With: patient            Time Calculation  PT Charges     Row Name 02/02/21 0944             Time Calculation    Start Time  0830  -MW      PT Goal Re-Cert Due Date  02/12/21  -MW        User Key  (r) = Recorded By, (t) = Taken By, (c) = Cosigned By    Initials Name Provider Type    MW Luz Felton, PT Physical Therapist            Therapy Charges for Today     Code Description Service Date Service Provider Modifiers Qty    49100141930 HC PT EVAL LOW COMPLEXITY 4 2/2/2021 Luz Felton, PT GP 1                    Luz Felton, PT  2/2/2021

## 2021-02-02 NOTE — PROGRESS NOTES
Case Management Discharge Note      Final Note: HH ordered for follow up post op wound care. Patient does not want HH since she feels she won't be home bound. I called and talked with Gerardo RESENDEZ/[facility] wound care and he taught  to do wound care and gave her supplies. So, patient home without HH and  comfortable with wound care. Marylin @ [id number]         Selected Continued Care - Discharged on 2/2/2021 Admission date: 2/1/2021 - Discharge disposition: Home or Self Care    Destination    No services have been selected for the patient.              Durable Medical Equipment    No services have been selected for the patient.              Dialysis/Infusion    No services have been selected for the patient.              Home Medical Care    No services have been selected for the patient.              Therapy    No services have been selected for the patient.              Community Resources    No services have been selected for the patient.                       Final Discharge Disposition Code: 01 - home or self-care

## 2021-02-02 NOTE — PLAN OF CARE
Problem: Adult Inpatient Plan of Care  Goal: Plan of Care Review  Outcome: Ongoing, Progressing  Flowsheets  Taken 2/2/2021 0308 by Domingo Tolbert, RN  Progress: no change  Outcome Summary: VSS. Resting well. C/O bladder pain at shift start. Resolved with PO pain med. Up with assist. Aponte in. Fluids infusing. No other complaints. Continue care.  Taken 2/1/2021 1004 by Beatriz Arriola, RN  Plan of Care Reviewed With: patient   Goal Outcome Evaluation:     Progress: no change  Outcome Summary: VSS. Resting well. C/O bladder pain at shift start. Resolved with PO pain med. Up with assist. Aponte in. Fluids infusing. No other complaints. Continue care.

## 2021-02-02 NOTE — PROGRESS NOTES
Gynecologic Oncology   Daily Progress Note    Chief Complaint: Post-op    Subjective   Patient did well overnight.  Her pain is controlled with PO medication, however she had a short episode of confusion last night following Oxycodone use.  Back to baseline now.  She is not having nausea or emesis.  She is tolerating a clears and desires regular diet.  She has sidhu in place.  She is ambulating.  She is using her incentive spirometer.      Updated ROS is remarkable for denies headache, chest pain, shortness of breath    Objective   Temp:  [97.2 °F (36.2 °C)-98.6 °F (37 °C)] 97.4 °F (36.3 °C)  Heart Rate:  [51-97] 51  Resp:  [16-20] 20  BP: (115-148)/(57-76) 117/59  Vitals:    02/02/21 0311   BP: 117/59   Pulse: 51   Resp: 20   Temp: 97.4 °F (36.3 °C)   SpO2: 94%     I/O last 3 completed shifts:  In: 550 [I.V.:550]  Out: 250 [Urine:200; Blood:50]     GENERAL: Alert, well-appearing female in no apparent distress.    CARDIOVASCULAR: Normal rate, regular rhythm, no murmurs, rubs, or gallops.    RESPIRATORY: Clear to auscultation bilaterally, normal respiratory effort  GASTROINTESTINAL:  Soft, appropriately tender, non-distended, no rebound or guarding.  Positive bowel sounds.    GENITOURINARY: Sidhu in place. Incision remains partially open, clean, and dry  SKIN:  Warm, dry, well-perfused.    PSYCHIATRIC: AO x3, with appropriate affect, normal thought processes  EXREMITIES: Symmetric. No peripheral edema.    Lab Results   Component Value Date    WBC 5.63 01/29/2021    HGB 14.7 01/29/2021    HCT 43.1 01/29/2021    MCV 90.4 01/29/2021     01/29/2021    NEUTROABS 3.47 01/29/2021    GLUCOSE 97 01/29/2021    BUN 19 01/29/2021    CREATININE 1.03 (H) 01/29/2021    EGFRIFNONA 53 (L) 01/29/2021     01/29/2021    K 4.0 01/29/2021     01/29/2021    CO2 24.0 01/29/2021    MG 1.1 (L) 07/25/2014    CALCIUM 10.0 01/29/2021         Assessment/Plan   Lee Ann ZHENG Alyse is a 70 y.o. female POD1 s/p wide local excision 3cm  right periurethral lesion, modified right radical vulvectomy of 3cm scar with tumor and perianal biopsy.     1.  Post-operative care  -Routine care: encourage ambulation, IS use, advance diet as tolerated, saline lock IV, bowel regimen, VTE prophylaxis  -PO pain control adequate  -Aponte in place  -Wound team consulted for open perineal surgical wound but were unable to see patient yesterday evening    2.  HTN  - normotensive overnight  - hold home Losartan post-op    3. Hypothyroidism  - continue home Synthroid    4. Hyperlipidemia  - continue home Statin     5. Depression  - help home Amitriptyline immediately post-op     6.  Disposition  -Discharge home.         Silvana Alfonso MD  02/02/21  06:08 EST    I saw and evaluated the patient. I agree with the findings and the plan of care as documented in the note.  Wound with blue gauze in place.  No erythema suggestive of drainage.  Aponte catheter removed by nurse with me at the bedside.  Discharge home today.  Follow-up in 1 week's time.  No narcotics as an outpatient at patient's request and related to confusion post oxycodone in the hospital.  Pain should be well controlled with Tylenol and ibuprofen.  Possibility of urinary dysfunction worsening was discussed with patient.  All questions were answered.    Mikaela Mccauley MD  02/02/21  10:14 EST

## 2021-02-02 NOTE — PLAN OF CARE
Goal Outcome Evaluation:  Plan of Care Reviewed With: patient  Progress: no change  Outcome Summary: PT wound care consult for dressings/ packing of vulvar area s/p resection of tumor and scar tissue. Wound with several retention sutures in place. Wound bed pink, red subq tissues. Gently packed area with HFBc damp with saline to improve flexibility. Covered with ABD pad. Advised pt to use peripad or maxi pad for improved comfort at home. Plan to review dressing placement with spouse prior to d/c. Change HFB packing prn displacement with voiding or pericare. 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

## 2021-02-02 NOTE — PLAN OF CARE
Goal Outcome Evaluation:     Progress: improving     No complaints. Voided without complaints after removal of cath. PT WOC provided wound care and patient./family discharge education. Patient discharged home with .

## 2021-02-02 NOTE — DISCHARGE SUMMARY
Gynecologic Oncology   [facility]   Discharge Summary    Date of Admission: 2/1/2021    Date of Discharge:  02/02/21      Admission Diagnoses:    Vulvar cancer (CMS/HCC) [C51.9]  Malignant neoplasm of overlapping sites of vulva (CMS/HCC) [C51.8]  Vulvar cancer (CMS/HCC) [C51.9]  Vulvar cancer (CMS/HCC) [C51.9]   Recurrent vulvar cancer      Discharge Diagnoses: same as above      Hospital Course:  Lee Ann Cullen is a 70 y.o. female who presented with diagnosis of recurrent vulvar cancer      On 2/1/2021 she was admitted and underwent WIDE LOCAL EXCISION 3CM  RIGHT PERIUREATHRAL LEASION,  MODIFIED RIGHT RADICAL VULVECTOMY OF 3 CM SCAR WITH TUMOR,  PERIANAL BIOPSY.  Please refer to dictated operative note for further details.  Post-operatively she did well.  Her diet was advanced.  Nonsurgical medical illnesses were appropriately managed during her hospital stay.  Her POD1 labs showed stable Hct 41.  Chemistries were unremarkable.  By POD1 she was tolerating a general diet, voiding spontaneously, having her pain controlled with oral medications, and otherwise meeting criteria for discharge and she was discharged home in stable condition.    Discharge Medications:     Discharge Medications      New Medications      Instructions Start Date   acetaminophen 325 MG tablet  Commonly known as: TYLENOL   650 mg, Oral, Every 6 Hours      ibuprofen 600 MG tablet  Commonly known as: ADVIL,MOTRIN   600 mg, Oral, Every 6 Hours PRN      ondansetron 4 MG tablet  Commonly known as: ZOFRAN   4 mg, Oral, Every 6 Hours PRN      oxyCODONE 5 MG immediate release tablet  Commonly known as: ROXICODONE   5 mg, Oral, Every 4 Hours PRN         Changes to Medications      Instructions Start Date   clobetasol 0.05 % cream  Commonly known as: TEMOVATE  What changed: how much to take   Topical, 2 Times Daily         Continue These Medications      Instructions Start Date   amitriptyline 10 MG tablet  Commonly known as: ELAVIL   10 mg,  Oral, Nightly      atorvastatin 20 MG tablet  Commonly known as: LIPITOR   20 mg, Oral, Nightly      cetirizine 10 MG tablet  Commonly known as: zyrTEC   10 mg, Oral, Daily      estradiol 0.5 MG tablet  Commonly known as: ESTRACE   0.5 mg, Oral, Daily      fluticasone 50 MCG/ACT nasal spray  Commonly known as: FLONASE   2 sprays, Nasal, Daily, Administer 2 sprays in each nostril for each dose.      levothyroxine 112 MCG tablet  Commonly known as: SYNTHROID, LEVOTHROID   56 mcg, Oral, Daily      losartan 100 MG tablet  Commonly known as: COZAAR   50 mg, Oral, Daily      magnesium oxide 400 MG tablet  Commonly known as: MAG-OX   400 mg, Oral, 3 Times Weekly      omeprazole 20 MG capsule  Commonly known as: priLOSEC   20 mg, Oral, Daily      VITAMIN D-3 PO   2,000 Units, Oral, Daily             Discharge Instructions:  She was instructed to call or return to medical attention for fever, severe pain, persistent nausea and vomiting, questions regarding medications, concerns regarding her incisions, excessive vaginal bleeding or discharge, or for any other acute concerns.  She was also instructed not to drive while taking narcotic pain medications, to abide by pelvic rest, avoid tub baths, and to avoid heavy lifting for at least 6 weeks.  Patient was educated regarding constipation prevention.      Condition at Discharge: Stable    Discharge Destination: Home    Results pending at time of discharge: Final surgical pathology    Follow Up: Dr. Mccauley in 1 weeks    Electronically Signed by: Mikaela Mccauley MD  Date: 2/2/2021      Time:  09:27 EST

## 2021-02-04 ENCOUNTER — NURSE TRIAGE (OUTPATIENT)
Dept: CALL CENTER | Facility: HOSPITAL | Age: 71
End: 2021-02-04

## 2021-02-04 NOTE — TELEPHONE ENCOUNTER
She has no fever, the area looks to be filling in with scab, told her this is normal healing, Now if has a discharge other than clear, report that.  Explained if she still thinks she would like home health to start coming then notify her doctor and ask him to order it.      Reason for Disposition  • Surgical incision after sutures or staples removed, questions about    Additional Information  • Negative: [1] Major abdominal surgical incision AND [2] wound gaping open AND [3] visible internal organs  • Negative: Sounds like a life-threatening emergency to the triager  • Negative: [1] Bleeding from incision AND [2] won't stop after 10 minutes of direct pressure  • Negative: [1] Widespread rash AND [2] bright red, sunburn-like  • Negative: Severe pain in the incision  • Negative: [1] Incision gaping open AND [2] < 48 hours since wound re-opened  • Negative: [1] Incision gaping open AND [2] length of opening > 2 inches (5 cm)  • Negative: Patient sounds very sick or weak to the triager  • Negative: Sounds like a serious complication to the triager  • Negative: Fever > 100.4 F (38.0 C)  • Negative: [1] Incision looks infected (spreading redness, pain) AND [2] fever > 99.5 F (37.5 C)  • Negative: [1] Incision looks infected (spreading redness, pain) AND [2] large red area (> 2 in. or 5 cm)  • Negative: [1] Incision looks infected (spreading redness, pain) AND [2] face wound  • Negative: [1] Red streak runs from the incision AND [2] longer than 1 inch (2.5 cm)  • Negative: [1] Pus or bad-smelling fluid draining from incision AND [2] no fever  • Negative: [1] Post-op pain AND [2] not controlled with pain medications  • Negative: Dressing soaked with blood or body fluid (e.g., drainage)  • Negative: [1] Raised bruise and [2] size > 2 inches (5 cm) and expanding  • Negative: [1] Caller has URGENT question AND [2] triager unable to answer question  • Negative: [1] INCREASING pain in incision AND [2] > 2 days (48 hours) since  "surgery  • Negative: [1] Small red area or streak AND [2] no fever  • Negative: [1] Clear or blood-tinged fluid draining from wound AND [2] no fever  • Negative: [1] Incision gaping open AND [2] > 48 hours since wound re-opened AND [3] length of opening > 1/2 inch (12 mm)  • Negative: [1] Incision on face gaping open after skin glue AND [2] > 48 hours since wound re-opened AND [3] length of opening > 1/4 inch (6 mm)  • Negative: Suture or staple removal is overdue  • Negative: [1] Suture or staple came out early AND [2] caller wants wound checked  • Negative: [1] Caller has NON-URGENT question AND [2] triager unable to answer question  • Negative: Pimple where a stitch comes through the skin  • Negative: Suture (or staple) came out early  • Negative: Mild bruising near incision site  • Negative: Suture removal date, questions about  • Negative: Skin tape (e.g., Steri-strips) removal, questions about  • Negative: Sutured or stapled surgical incision, questions about    Answer Assessment - Initial Assessment Questions  1. SYMPTOM: \"What's the main symptom you're concerned about?\" (e.g., redness, pain, drainage)      White appearance edges of surgical site vulva  2. ONSET: \"When did this  start?\"      Looked today  3. SURGERY: \"What surgery was performed?\"      Vulvulectomy for cancer  4. DATE of SURGERY: \"When was surgery performed?\"       2/1  5. INCISION SITE: \"Where is the incision located?\"       vaginal  6. REDNESS: \"Is there any redness at the incision site?\" If yes, ask: \"How wide across is the redness?\" (Inches, centimeters)       none  7. PAIN: \"Is there any pain?\" If so, ask: \"How bad is it?\"  (Scale 1-10; or mild, moderate, severe)      Sore as expected  8. BLEEDING: \"Is there any bleeding?\" If so, ask: \"How much?\" and \"Where?\"      none  9. DRAINAGE: \"Is there any drainage from the incision site?\" If yes, ask: \"What color and how much?\" (e.g., red, cloudy, pus; drops, teaspoon)      No drainage the area " "has edges appears to form scab according to . Explained to him what granular tissue looks like and he agrees that is what he is seeing.    10. FEVER: \"Do you have a fever?\" If so, ask: \"What is your temperature, how was it measured, and when did it start?\"        afebrile  11. OTHER SYMPTOMS: \"Do you have any other symptoms?\" (e.g., shaking chills, weakness, rash elsewhere on body)        none    Protocols used: POST-OP INCISION SYMPTOMS AND QUESTIONS-ADULT-      "

## 2021-02-10 ENCOUNTER — OFFICE VISIT (OUTPATIENT)
Dept: GYNECOLOGIC ONCOLOGY | Facility: CLINIC | Age: 71
End: 2021-02-10

## 2021-02-10 VITALS
WEIGHT: 152 LBS | HEART RATE: 65 BPM | BODY MASS INDEX: 24.43 KG/M2 | RESPIRATION RATE: 16 BRPM | DIASTOLIC BLOOD PRESSURE: 77 MMHG | HEIGHT: 66 IN | OXYGEN SATURATION: 96 % | TEMPERATURE: 96.8 F | SYSTOLIC BLOOD PRESSURE: 178 MMHG

## 2021-02-10 DIAGNOSIS — Z98.890 POSTOPERATIVE STATE: Primary | ICD-10-CM

## 2021-02-10 DIAGNOSIS — Z98.890 POST-OPERATIVE STATE: ICD-10-CM

## 2021-02-10 PROCEDURE — 99024 POSTOP FOLLOW-UP VISIT: CPT | Performed by: OBSTETRICS & GYNECOLOGY

## 2021-02-10 NOTE — PROGRESS NOTES
"Lee Ann Cullen  [id number]  1950      Reason for Visit:  Postoperative evaluation    History of Present Illness:  Patient is a very pleasant 70 y.o. woman who presents for a post operative evaluation status post WIDE LOCAL EXCISION 3CM  RIGHT PERIUREATHRAL LEASION,  MODIFIED RIGHT RADICAL VULVECTOMY OF 3 CM SCAR WITH TUMOR,  PERIANAL BIOPSY performed on 2/1.      Surgery and hospital course were uncomplicated.  Today, patient notes normal bowel and bladder function.  Her pain is well controlled. She noticed some drainage from her wound and is concerned for possible infection.  She denies fevers, chills or body aches.  She has normal appetite and is able to walk around without discomfort.  She has some pain with sitting for a prolonged time.   Her  is continuing to help her keep her wound dry as instructed by the wound care team.  She has questions about resuming normal activities.     Past Medical History, Past Surgical History, Social History, Family History have been reviewed and are without significant changes except as mentioned.    Review of Systems   All other systems were reviewed and are negative except as mentioned above.    Medications:  The current medication list was reviewed in the EMR    ALLERGIES:    Allergies   Allergen Reactions   • Phenergan [Promethazine Hcl] Anaphylaxis     Involuntary muscular movements   • Augmentin [Amoxicillin-Pot Clavulanate] Itching   • Hydrocodone-Acetaminophen Irritability           /77   Pulse 65   Temp 96.8 °F (36 °C) (Temporal)   Resp 16   Ht 167.6 cm (65.98\")   Wt 68.9 kg (152 lb)   SpO2 96%   BMI 24.55 kg/m²         Physical Exam  Constitutional:  Patient is a pleasant woman in no acute distress.  Extremities:  Bilateral lower extremities are non-tender.  Gynecologic: Erythematous labia with radiation changes and open incision.  Some yellow exudate noted within the wound base.  Appropriately tender, no bleeding. Exam not consistent with " infection.    PATHOLOGY:  Final Diagnosis   1.  PERIANAL BIOPSY:                 Benign squamous papilloma with surface reactive change                 Negative for dysplasia and neoplasm    2.  VULVAR LESION DESIGNATED LATERAL, WIDE EXCISION:                 Central area of inflammation and foreign body reaction; no residual neoplasm identified                 Margins of excision free of neoplasm    3.  VULVAR LESION DESIGNATED MEDIAL VULVAR LESION, WIDE EXCISION:                 Central inflammation and scarring with background  of hypertrophic lichen sclerosis with squamous hyperplasia without dysplasia                Negative for high-grade dysplasia and neoplasia                 Margins of excision free                    ASSESSMENT/PLAN:  Lee Ann Cullen returns for a post-operative evaluation today.  All pathology reports were discussed with the patient. Wound appears to be healing appropriately with some yellow discharge suggestive of inadequate placement of foam wick.  Discussed wound care and referred patient to PT wound care for further assistance.       Overall, the patient is very pleased with her care.  I recommended continuation of post operative precautions as discussed.     She is to follow-up with wound care clinic - will evaluate there    Silvana Alfonso MD  02/09/21  19:05 EST

## 2021-02-11 ENCOUNTER — TELEPHONE (OUTPATIENT)
Dept: GYNECOLOGIC ONCOLOGY | Facility: CLINIC | Age: 71
End: 2021-02-11

## 2021-02-11 NOTE — TELEPHONE ENCOUNTER
Discussed with Dr. Mccauley, patient has been advised not to use the clobetasol on or near biopsy site due to prolonged healing and increased risk of infection. Advised patient silvadene has been sent to the pharmacy to use in the interim. Patient verbalized understanding.

## 2021-02-18 ENCOUNTER — HOSPITAL ENCOUNTER (OUTPATIENT)
Dept: PHYSICAL THERAPY | Facility: HOSPITAL | Age: 71
Setting detail: THERAPIES SERIES
Discharge: HOME | End: 2021-02-18

## 2021-02-18 DIAGNOSIS — S31.40XD OPEN WOUND OF VULVA, SUBSEQUENT ENCOUNTER: Primary | ICD-10-CM

## 2021-02-18 PROCEDURE — 97161 PT EVAL LOW COMPLEX 20 MIN: CPT

## 2021-02-18 PROCEDURE — 97597 DBRDMT OPN WND 1ST 20 CM/<: CPT

## 2021-02-18 NOTE — THERAPY EVALUATION
"    Outpatient Rehabilitation - Wound/Debridement Initial Janice Reyes     Patient Name: Lee Ann Cullen  : 1950  MRN: [id number]  Today's Date: 2021                  Admit Date: 2021    Visit Dx:    ICD-10-CM ICD-9-CM   1. Open wound of vulva, subsequent encounter  S31.40XD V58.89     878.4       Patient Active Problem List   Diagnosis   • Vulvar cancer (CMS/HCC)   • Lichen sclerosus of female genitalia   • Screening for colon cancer   • Essential hypertension   • Gastroesophageal reflux disease without esophagitis   • Hypercholesterolemia   • Hypothyroidism   • Recurrent vulvar cancer (CMS/HCC)   • Malignant neoplasm of overlapping sites of vulva (CMS/HCC)        Past Medical History:   Diagnosis Date   • Arthritis    • Bilateral cataracts    • Depression    • Frequent UTI    • GERD (gastroesophageal reflux disease)    • History of basal cell carcinoma     nose   • History of methicillin resistant staphylococcus aureus (MRSA)     \"many years ago\"   • HTN (hypertension)    • Hyperlipidemia    • Hypothyroidism    • IC (interstitial cystitis)     managed by Dr. Funes   • Insomnia    • Lichen sclerosus of female genitalia    • MRSA (methicillin resistant staph aureus) culture positive    • Osteoporosis    • PONV (postoperative nausea and vomiting)    • Seasonal allergies    • Thyroid disease    • Vulvar cancer (CMS/HCC)         Past Surgical History:   Procedure Laterality Date   • APPENDECTOMY     • BACK SURGERY  2016   • CATARACT EXTRACTION Bilateral    • COLONOSCOPY     • CYSTOSCOPY BLADDER HYDRODISTENSION      with biopsies   • LAPAROSCOPIC CHOLECYSTECTOMY  2016   • OTHER SURGICAL HISTORY      microendoscopic disectomy   • PORTACATH PLACEMENT     • SKIN BIOPSY     • TOTAL ABDOMINAL HYSTERECTOMY WITH SALPINGO OOPHORECTOMY     • TOTAL KNEE ARTHROPLASTY Left    • VENOUS ACCESS DEVICE (PORT) REMOVAL     • VULVECTOMY Left 2014    modified radical vulvectomy with left " inguinofemoral LND   • VULVECTOMY Right 2/1/2021    Procedure: WIDE LOCAL EXCISION RIGHT PERIURETHRAL LESION, MODIFIED RIGHT RADICAL VULVECTOMY OF SCAR WITH TUMOR,  PERIANAL BIOPSY;  Surgeon: Mikaela Mccauley MD;  [facility];  Service: Gynecology Oncology;  Laterality: Right;       Patient History     Row Name 02/18/21 1200             History    Chief Complaint  Ulcer, wound or other skin conditions;Pain  -      Type of Pain  Other pain vulvar pain  -      Brief Description of Current Complaint  Pt is s/p radical vulvectomy. Pt known to PT wound care from inpt admission, where PT educated pt and spouse on home dressing changes.  -      Previous treatment for THIS PROBLEM  Medication;Surgery  -      Patient/Caregiver Goals  Heal wound;Relieve pain  -      Patient seeing anyone else for problem(s)?  Dr. Parisa MD, surgeon  -      How has patient tried to help current problem?  Spouse has been assisting with dressing changes with saline-moist HFBc. Secured with menstrual pads. Changed often d/t proximity to urethra.  -      Related/Recent Hospitalizations  Yes  -      Location (Hospital Name)  [facility]  -         Pain     Pain Location  Other (Comment) vulva  -      Pain at Present  4  -MC      Pain at Best  2  -MC      Pain at Worst  8  -         Services    Are you currently receiving Home Health services  No  -      Do you plan to receive Home Health services in the near future  No  -         Daily Activities    Primary Language  English  -      Are you able to read  Yes  -      Are you able to write  Yes  -      How does patient learn best?  Listening;Demonstration  -      Teaching needs identified  Management of Condition  -      Patient is concerned about/has problems with  Other (comment) wound mgmt  -      Pt Participated in POC and Goals  Yes  -         Safety    Are you being hurt, hit, or frightened by anyone at home or in your life?  No  -MC      Are you  being neglected by a caregiver  No  -MC        User Key  (r) = Recorded By, (t) = Taken By, (c) = Cosigned By    Initials Name Provider Type    Gabriella Lau PT Physical Therapist          EVALUATION  PT Ortho     Row Name 02/18/21 1200       Subjective Pain    Subjective Pain Comment  Pt reports less pain with sitting after tx.  -MC       Transfers    Sit-Stand [place] (Transfers)  independent  -MC    Stand-Sit [place] (Transfers)  independent  -MC    Comment (Transfers)  supine for tx  -MC       Gait/Stairs (Locomotion)    [place] Level (Gait)  independent  -MC      User Key  (r) = Recorded By, (t) = Taken By, (c) = Cosigned By    Initials Name Provider Type    Gabriella Lau PT Physical Therapist        LDA Wound     Row Name 02/18/21 1200             Wound 02/18/21 1200 Right labia Incision    Wound - Properties Group Placement Date: 02/18/21  -MC Placement Time: 1200  -MC Present on Hospital Admission: Y  -MC Side: Right  -MC Location: labia  -MC Primary Wound Type: Incision  -MC    Dressing Appearance  intact;moist drainage  -MC      Closure  Sutures  -      Base  moist;pink;red;yellow;slough  -MC      Periwound  intact;dry;pink;redness  -      Periwound Temperature  warm  -MC      Periwound Skin Turgor  soft  -MC      Edges  irregular;open  -MC      Wound Length (cm)  3.8 cm  -MC      Wound Width (cm)  1.6 cm  -MC      Wound Depth (cm)  2.2 cm toward posterior aspect  -MC      Drainage Characteristics/Odor  serosanguineous  -MC      Drainage Amount  small  -MC      Care, Wound  cleansed with;wound cleanser;debrided  -MC      Dressing Care  dressing applied;antimicrobial agent applied;foam saline-moist HFBc, secured with maxi pad/underwear  -      Periwound Care  cleansed with pH balanced cleanser;dry periwound area maintained  -MC      Sutures Removed Intact  Yes  -MC      Number of Sutures Removed  2  -MC      Retired Wound - Properties Group Date first assessed:  02/18/21  - Time first assessed: 1200  -MC Present on Hospital Admission: Y  - Side: Right  - Location: labia  -MC Primary Wound Type: Incision  -      User Key  (r) = Recorded By, (t) = Taken By, (c) = Cosigned By    Initials Name Provider Type    Gabriella Lau PT Physical Therapist            WOUND DEBRIDEMENT  Total area of Debridement: 2 cm2  Debridement Site 1  Location- Site 1: Vulvar wound  Selective Debridement- Site 1: Wound Surface <20cmsq  Instruments- Site 1: #15, scapel, tweezers, scissors  Excised Tissue Description- Site 1: moderate, slough  Bleeding- Site 1: none             Therapy Education     Row Name 02/18/21 1200             Therapy Education    Education Details  Reviewed s/sx of infection. Reviewed home dressing changes with skintegrity spray to clean, saline-moist HFBc to pack. Call MD with any questions or concerns.  -MC      Given  Symptoms/condition management;Bandaging/dressing change  -      Program  New  -MC      How Provided  Verbal;Demonstration  -MC      Provided to  Patient  -MC      Level of Understanding  Teach back education performed;Verbalized  -        User Key  (r) = Recorded By, (t) = Taken By, (c) = Cosigned By    Initials Name Provider Type    Gabriella Lau PT Physical Therapist          Recommendation and Plan  PT Assessment/Plan     Row Name 02/18/21 1200          PT Assessment    Functional Limitations  Impaired locomotion;Limitations in functional capacity and performance;Performance in leisure activities;Performance in self-care ADL;Other (comment) wound mgmt  -     Impairments  Integumentary integrity;Pain  -     Assessment Comments  Pt presents with post-op wound to the R vulva and periurethral area, previously managed at home with spouse assistance. Pt presents for follow-up to promote ongoing healing. Pt's surgeon, Dr. Mikaela Mccauley, present to assess the patient as well. Pt with moist, red/pink wound bed with several sutures  present. Two sutures removed - one embedded in necrotic material, the other in the posterior aspect of the wound, not approximating tissue but causing pt notable pain with sitting. Pt reports improved pain with sitting after suture removal. PT able to debride additional slough from the wound bed to decrease bioburden and allow for additional granulation/epithelialization. Pt's current POC for clean home dressings is appropriate. Pt will benefit from skilled PT wound care for debridement and advanced dressings management.  -     Rehab Potential  Good  -     Patient/caregiver participated in establishment of treatment plan and goals  Yes  -     Patient would benefit from skilled therapy intervention  Yes  -MC        PT Plan    PT Frequency  1x/week  -     Predicted Duration of Therapy Intervention (PT)  12 visits  -     Planned CPT's?  PT EVAL LOW COMPLEXITY: 40298;PT SELF CARE/MGMT/TRAIN 15 MIN: 36981;PT NONSELECT DEBRIDE 15 MIN: 40935;PT VANI DEBRIDE OPEN WOUND UP TO 20 CM: 62424;PT NLFU MIST: 69675;PT THER SUPP EA 15 MIN  -     Physical Therapy Interventions (Optional Details)  wound care;patient/family education  -     PT Plan Comments  debridement, dressings management, ?MIST if clinically indicated  -       User Key  (r) = Recorded By, (t) = Taken By, (c) = Cosigned By    Initials Name Provider Type    Gabriella Lau, PT Physical Therapist            Goals  PT OP Goals     Row Name 02/18/21 1200          PT Short Term Goals    STG 1  Pt will verbalize s/sx of infection.  -     STG 2  Pt will demonstrate 25% reduction in wound area to indicate healing progress.  -        Long Term Goals    LTG 1  Pt will verbalize/demonstrate independence with clean home dressing changes.  -     LTG 2  Pt will demonstrate 75% reduction in wound area to indicate healing progress.  -        Time Calculation    PT Goal Re-Cert Due Date  05/19/21  -       User Key  (r) = Recorded By, (t) = Taken By,  (c) = Cosigned By    Initials Name Provider Type    Gabriella Lau, PT Physical Therapist          Time Calculation: Start Time: 1200  Therapy Charges for Today     Code Description Service Date Service Provider Modifiers Qty    01646832101 HC PT EVAL LOW COMPLEXITY 4 2/18/2021 Gabriella Gill, PT GP 1    [id number] HC VANI DEBRIDE OPEN WOUND UP TO 20CM 2/18/2021 Gabriella Gill, PT GP 1                Gabriella Gill, PT  2/18/2021

## 2021-02-24 ENCOUNTER — HOSPITAL ENCOUNTER (OUTPATIENT)
Dept: PHYSICAL THERAPY | Facility: HOSPITAL | Age: 71
Setting detail: THERAPIES SERIES
Discharge: HOME | End: 2021-02-24

## 2021-02-24 DIAGNOSIS — S31.40XD OPEN WOUND OF VULVA, SUBSEQUENT ENCOUNTER: Primary | ICD-10-CM

## 2021-02-24 PROCEDURE — 97597 DBRDMT OPN WND 1ST 20 CM/<: CPT

## 2021-02-24 NOTE — THERAPY WOUND CARE TREATMENT
"    Outpatient Rehabilitation - Wound/Debridement Treatment Note  [facility]     Patient Name: Lee Ann Cullen  : 1950  MRN: [id number]  Today's Date: 2021                 Admit Date: 2021    Visit Dx:    ICD-10-CM ICD-9-CM   1. Open wound of vulva, subsequent encounter  S31.40XD V58.89     878.4        Patient Active Problem List   Diagnosis   • Vulvar cancer (CMS/HCC)   • Lichen sclerosus of female genitalia   • Screening for colon cancer   • Essential hypertension   • Gastroesophageal reflux disease without esophagitis   • Hypercholesterolemia   • Hypothyroidism   • Recurrent vulvar cancer (CMS/HCC)   • Malignant neoplasm of overlapping sites of vulva (CMS/HCC)        Past Medical History:   Diagnosis Date   • Arthritis    • Bilateral cataracts    • Depression    • Frequent UTI    • GERD (gastroesophageal reflux disease)    • History of basal cell carcinoma     nose   • History of methicillin resistant staphylococcus aureus (MRSA)     \"many years ago\"   • HTN (hypertension)    • Hyperlipidemia    • Hypothyroidism    • IC (interstitial cystitis)     managed by Dr. Funes   • Insomnia    • Lichen sclerosus of female genitalia    • MRSA (methicillin resistant staph aureus) culture positive    • Osteoporosis    • PONV (postoperative nausea and vomiting)    • Seasonal allergies    • Thyroid disease    • Vulvar cancer (CMS/HCC)         Past Surgical History:   Procedure Laterality Date   • APPENDECTOMY     • BACK SURGERY  2016   • CATARACT EXTRACTION Bilateral    • COLONOSCOPY     • CYSTOSCOPY BLADDER HYDRODISTENSION      with biopsies   • LAPAROSCOPIC CHOLECYSTECTOMY  2016   • OTHER SURGICAL HISTORY      microendoscopic disectomy   • PORTACATH PLACEMENT     • SKIN BIOPSY     • TOTAL ABDOMINAL HYSTERECTOMY WITH SALPINGO OOPHORECTOMY     • TOTAL KNEE ARTHROPLASTY Left    • VENOUS ACCESS DEVICE (PORT) REMOVAL     • VULVECTOMY Left 2014    modified radical vulvectomy with left " inguinofemoral LND   • VULVECTOMY Right 2/1/2021    Procedure: WIDE LOCAL EXCISION RIGHT PERIURETHRAL LESION, MODIFIED RIGHT RADICAL VULVECTOMY OF SCAR WITH TUMOR,  PERIANAL BIOPSY;  Surgeon: Mikaela Mccauley MD;  [facility];  Service: Gynecology Oncology;  Laterality: Right;         EVALUATION  PT Ortho     Row Name 02/24/21 1530       Subjective Comments    Subjective Comments  Pt reports improving pain with sitting, states her hips ache by the end of the day.  -       Subjective Pain    Able to rate subjective pain?  yes  -    Pre-Treatment Pain Level  0  -    Post-Treatment Pain Level  0  -    Subjective Pain Comment  Mild pain with wound debridement  -JM       Transfers    Sit-Stand [place] (Transfers)  independent  -JM    Stand-Sit [place] (Transfers)  independent  -JM    Comment (Transfers)  supine for tx  -       Gait/Stairs (Locomotion)    [place] Level (Gait)  independent  -      User Key  (r) = Recorded By, (t) = Taken By, (c) = Cosigned By    Initials Name Provider Type    Odette Betancourt, PT Physical Therapist          LDA Wound     Row Name 02/24/21 1530             Wound 02/18/21 1200 Right labia Incision    Wound - Properties Group Placement Date: 02/18/21  -MC Placement Time: 1200  - Present on Hospital Admission: Y  -MC Side: Right  -MC Location: labia  -MC Primary Wound Type: Incision  -MC    Wound Image  Images linked: 2  -JM      Dressing Appearance  intact;moist drainage  -JM      Closure  Sutures  -      Base  moist;pink;red;yellow;slough  -      Periwound  intact;dry;pink;redness  -      Periwound Temperature  warm  -      Periwound Skin Turgor  soft  -      Edges  irregular;open  -JM      Wound Length (cm)  5 cm  -JM      Wound Width (cm)  1 cm  -JM      Wound Depth (cm)  2.5 cm  -JM      Drainage Characteristics/Odor  serosanguineous;yellow  -JM      Drainage Amount  small  -      Care, Wound  cleansed with;wound cleanser;debrided   -ELLA      Dressing Care  dressing applied;antimicrobial agent applied saline-moist HFBc, secured with clay-pad  -ELLA      Periwound Care  cleansed with pH balanced cleanser;dry periwound area maintained  -ELLA      Sutures Removed Intact  Yes  -ELLA      Number of Sutures Removed  1 removed at MD request  -ELLA      Retired Wound - Properties Group Date first assessed: 02/18/21  - Time first assessed: 1200  - Present on Hospital Admission: Y  - Side: Right  - Location: labia  - Primary Wound Type: Incision  -      User Key  (r) = Recorded By, (t) = Taken By, (c) = Cosigned By    Initials Name Provider Type    Gabriella Lau, PT Physical Therapist    Odette Betancourt, PT Physical Therapist            WOUND DEBRIDEMENT  Total area of Debridement: 2cmsq  Debridement Site 1  Location- Site 1: Vulvar wound  Selective Debridement- Site 1: Wound Surface <20cmsq  Instruments- Site 1: tweezers  Excised Tissue Description- Site 1: minimum, slough  Bleeding- Site 1: none             Therapy Education     Row Name 02/24/21 1530             Therapy Education    Education Details  Continue with home dressing changes, plan to follow up in one week, then may reduce to every other week if improving.  -ELLA      Given  Symptoms/condition management;Bandaging/dressing change  -ELLA      Program  Reinforced  -ELLA      How Provided  Verbal;Demonstration  -ELLA      Provided to  Patient  -ELLA      Level of Understanding  Teach back education performed;Verbalized  -ELLA        User Key  (r) = Recorded By, (t) = Taken By, (c) = Cosigned By    Initials Name Provider Type    Odette Betancourt, PT Physical Therapist          Recommendation and Plan  PT Assessment/Plan     Row Name 02/24/21 153          PT Assessment    Functional Limitations  Impaired locomotion;Limitations in functional capacity and performance;Performance in leisure activities;Performance in self-care ADL;Other (comment) wound mgmt  -     Impairments   Integumentary integrity;Pain  -     Assessment Comments  R vulvar wound with increasing granulation, less slough, still with min induration of periwound.  Family assisting with home dressing changes without difficulty.  Pt will continue to benefit from weekly tx for debridement and dressings management, may reduce to every other week once progressing well.  -        PT Plan    PT Frequency  1x/week  -     Physical Therapy Interventions (Optional Details)  patient/family education;wound care  -     PT Plan Comments  debridement, dressings management  -       User Key  (r) = Recorded By, (t) = Taken By, (c) = Cosigned By    Initials Name Provider Type    Odette Betancourt, PT Physical Therapist          Goals  PT OP Goals     Row Name 02/24/21 1530          Time Calculation    PT Goal Re-Cert Due Date  05/19/21  -       User Key  (r) = Recorded By, (t) = Taken By, (c) = Cosigned By    Initials Name Provider Type    Odette Betancourt, PT Physical Therapist          PT Goal Re-Cert Due Date: 05/19/21            Time Calculation: Start Time: 1530  Therapy Charges for Today     Code Description Service Date Service Provider Modifiers Qty    20813362281 HC VANI DEBRIDE OPEN WOUND UP TO 20CM 2/24/2021 Odette Christopher, PT GP 1                  Odette Christopher, PT  2/24/2021

## 2021-03-03 ENCOUNTER — HOSPITAL ENCOUNTER (OUTPATIENT)
Dept: PHYSICAL THERAPY | Facility: HOSPITAL | Age: 71
Setting detail: THERAPIES SERIES
Discharge: HOME | End: 2021-03-03

## 2021-03-03 DIAGNOSIS — S31.40XD OPEN WOUND OF VULVA, SUBSEQUENT ENCOUNTER: Primary | ICD-10-CM

## 2021-03-03 PROCEDURE — 97597 DBRDMT OPN WND 1ST 20 CM/<: CPT

## 2021-03-04 NOTE — THERAPY WOUND CARE TREATMENT
"    Outpatient Rehabilitation - Wound/Debridement Treatment Note  [facility]     Patient Name: Lee Ann Cullen  : 1950  MRN: [id number]  Today's Date: 3/4/2021                   Admit Date: 3/3/2021    Visit Dx:    ICD-10-CM ICD-9-CM   1. Open wound of vulva, subsequent encounter  S31.40XD V58.89     878.4       Patient Active Problem List   Diagnosis   • Vulvar cancer (CMS/HCC)   • Lichen sclerosus of female genitalia   • Screening for colon cancer   • Essential hypertension   • Gastroesophageal reflux disease without esophagitis   • Hypercholesterolemia   • Hypothyroidism   • Recurrent vulvar cancer (CMS/HCC)   • Malignant neoplasm of overlapping sites of vulva (CMS/HCC)        Past Medical History:   Diagnosis Date   • Arthritis    • Bilateral cataracts    • Depression    • Frequent UTI    • GERD (gastroesophageal reflux disease)    • History of basal cell carcinoma     nose   • History of methicillin resistant staphylococcus aureus (MRSA)     \"many years ago\"   • HTN (hypertension)    • Hyperlipidemia    • Hypothyroidism    • IC (interstitial cystitis)     managed by Dr. Funes   • Insomnia    • Lichen sclerosus of female genitalia    • MRSA (methicillin resistant staph aureus) culture positive    • Osteoporosis    • PONV (postoperative nausea and vomiting)    • Seasonal allergies    • Thyroid disease    • Vulvar cancer (CMS/HCC)         Past Surgical History:   Procedure Laterality Date   • APPENDECTOMY     • BACK SURGERY  2016   • CATARACT EXTRACTION Bilateral    • COLONOSCOPY     • CYSTOSCOPY BLADDER HYDRODISTENSION      with biopsies   • LAPAROSCOPIC CHOLECYSTECTOMY  2016   • OTHER SURGICAL HISTORY      microendoscopic disectomy   • PORTACATH PLACEMENT     • SKIN BIOPSY     • TOTAL ABDOMINAL HYSTERECTOMY WITH SALPINGO OOPHORECTOMY     • TOTAL KNEE ARTHROPLASTY Left    • VENOUS ACCESS DEVICE (PORT) REMOVAL     • VULVECTOMY Left 2014    modified radical vulvectomy with left " inguinofemoral LND   • VULVECTOMY Right 2/1/2021    Procedure: WIDE LOCAL EXCISION RIGHT PERIURETHRAL LESION, MODIFIED RIGHT RADICAL VULVECTOMY OF SCAR WITH TUMOR,  PERIANAL BIOPSY;  Surgeon: Mikaela Mccauley MD;  Location: [facility];  Service: Gynecology Oncology;  Laterality: Right;         EVALUATION  PT Ortho     Row Name 03/03/21 1430       Subjective Comments    Subjective Comments  Pt still with some discomfort when sitting, but improved.  -MC       Subjective Pain    Able to rate subjective pain?  yes  -MC    Pre-Treatment Pain Level  0  -MC    Post-Treatment Pain Level  0  -MC    Subjective Pain Comment  mild pain with debridement, measurements  -MC       Transfers    Sit-Stand [place] (Transfers)  independent  -MC    Stand-Sit [place] (Transfers)  independent  -MC    Comment (Transfers)  supine for tx  -MC       Gait/Stairs (Locomotion)    [place] Level (Gait)  independent  -MC      User Key  (r) = Recorded By, (t) = Taken By, (c) = Cosigned By    Initials Name Provider Type     Gabriella Gill, PT Physical Therapist          LDA Wound     Row Name 03/03/21 1430             Wound 02/18/21 1200 Right labia Incision    Wound - Properties Group Placement Date: 02/18/21  -MC Placement Time: 1200  -MC Present on Hospital Admission: Y  -MC Side: Right  -MC Location: labia  -MC Primary Wound Type: Incision  -MC    Dressing Appearance  intact;moist drainage  -MC      Closure  -- no sutures noted today - likely dissolved  -      Base  moist;pink;red;yellow;slough;granulating increasing granulation  -      Periwound  intact;dry;pink;redness  -      Periwound Temperature  warm  -      Periwound Skin Turgor  soft  -MC      Edges  irregular;open  -MC      Wound Length (cm)  4.5 cm  -MC      Wound Width (cm)  1.1 cm  -MC      Wound Depth (cm)  2.3 cm  -MC      Drainage Characteristics/Odor  serosanguineous;yellow  -MC      Drainage Amount  small  -      Care, Wound  cleansed with;wound  cleanser;debrided  -      Dressing Care  dressing applied;antimicrobial agent applied;foam saline-moist HFBc, peripad  -      Periwound Care  cleansed with pH balanced cleanser;dry periwound area maintained  -      Retired Wound - Properties Group Date first assessed: 02/18/21  - Time first assessed: 1200  - Present on Hospital Admission: Y  - Side: Right  - Location: labia  - Primary Wound Type: Incision  -      User Key  (r) = Recorded By, (t) = Taken By, (c) = Cosigned By    Initials Name Provider Type    Gabriella Lau, PT Physical Therapist            WOUND DEBRIDEMENT  Total area of Debridement: 3 cm2  Debridement Site 1  Location- Site 1: Vulvar wound  Selective Debridement- Site 1: Wound Surface <20cmsq  Instruments- Site 1: tweezers  Excised Tissue Description- Site 1: minimum, slough  Bleeding- Site 1: scant, held pressure, 1 minute             Therapy Education     Row Name 03/03/21 1430             Therapy Education    Education Details  OK to decrease frequency to every other week with spouse changing dressings prn. Call if appt needed sooner d/t any concerns  -      Given  Symptoms/condition management;Bandaging/dressing change  -      Program  Reinforced  -      How Provided  Verbal;Demonstration  -      Provided to  Patient  -      Level of Understanding  Teach back education performed;Verbalized  -        User Key  (r) = Recorded By, (t) = Taken By, (c) = Cosigned By    Initials Name Provider Type    Gabriella Lau PT Physical Therapist          Recommendation and Plan  PT Assessment/Plan     Row Name 03/03/21 1430          PT Assessment    Functional Limitations  Impaired locomotion;Limitations in functional capacity and performance;Performance in leisure activities;Performance in self-care ADL;Other (comment) wound mgmt  -     Impairments  Integumentary integrity;Pain  -     Assessment Comments  Pt with slight improvement in wound dimensions since  last assessment, and has increased granulation vs previous photos. Pt with mild amount of stringy yellow drainage, but no odor or other s/sx of infection. Pt appears to be progressing well with clean home dressing changes and periodic debridement.  -     Rehab Potential  Good  -     Patient/caregiver participated in establishment of treatment plan and goals  Yes  -     Patient would benefit from skilled therapy intervention  Yes  -        PT Plan    PT Frequency  1x/week;Other (comment) every other week  -     Physical Therapy Interventions (Optional Details)  wound care;patient/family education  -     PT Plan Comments  debridement, dressings management  -       User Key  (r) = Recorded By, (t) = Taken By, (c) = Cosigned By    Initials Name Provider Type     Gabriella Gill, PT Physical Therapist          Goals  PT OP Goals     Row Name 03/03/21 1430          Time Calculation    PT Goal Re-Cert Due Date  05/19/21  -       User Key  (r) = Recorded By, (t) = Taken By, (c) = Cosigned By    Initials Name Provider Type     Gabriella Gill, PT Physical Therapist          PT Goal Re-Cert Due Date: 05/19/21            Time Calculation: Start Time: 1430  Therapy Charges for Today     Code Description Service Date Service Provider Modifiers Qty    [id number] HC VANI DEBRIDE OPEN WOUND UP TO 20CM 3/3/2021 Gabriella Gill, PT GP 1                  Gabriella Gill, PT  3/4/2021

## 2021-03-17 ENCOUNTER — HOSPITAL ENCOUNTER (OUTPATIENT)
Dept: PHYSICAL THERAPY | Facility: HOSPITAL | Age: 71
Setting detail: THERAPIES SERIES
Discharge: HOME | End: 2021-03-17

## 2021-03-17 DIAGNOSIS — S31.40XD OPEN WOUND OF VULVA, SUBSEQUENT ENCOUNTER: Primary | ICD-10-CM

## 2021-03-17 PROCEDURE — 97597 DBRDMT OPN WND 1ST 20 CM/<: CPT

## 2021-03-17 NOTE — THERAPY PROGRESS REPORT/RE-CERT
"    Outpatient Rehabilitation - Wound/Debridement Progress Note   [facility]     Patient Name: Lee Ann Cullen  : 1950  MRN: [id number]  Today's Date: 3/17/2021                 Admit Date: 3/17/2021    Visit Dx:    ICD-10-CM ICD-9-CM   1. Open wound of vulva, subsequent encounter  S31.40XD V58.89     878.4       Patient Active Problem List   Diagnosis   • Vulvar cancer (CMS/HCC)   • Lichen sclerosus of female genitalia   • Screening for colon cancer   • Essential hypertension   • Gastroesophageal reflux disease without esophagitis   • Hypercholesterolemia   • Hypothyroidism   • Recurrent vulvar cancer (CMS/HCC)   • Malignant neoplasm of overlapping sites of vulva (CMS/HCC)        Past Medical History:   Diagnosis Date   • Arthritis    • Bilateral cataracts    • Depression    • Frequent UTI    • GERD (gastroesophageal reflux disease)    • History of basal cell carcinoma     nose   • History of methicillin resistant staphylococcus aureus (MRSA)     \"many years ago\"   • HTN (hypertension)    • Hyperlipidemia    • Hypothyroidism    • IC (interstitial cystitis)     managed by Dr. Funes   • Insomnia    • Lichen sclerosus of female genitalia    • MRSA (methicillin resistant staph aureus) culture positive    • Osteoporosis    • PONV (postoperative nausea and vomiting)    • Seasonal allergies    • Thyroid disease    • Vulvar cancer (CMS/HCC)         Past Surgical History:   Procedure Laterality Date   • APPENDECTOMY     • BACK SURGERY  2016   • CATARACT EXTRACTION Bilateral    • COLONOSCOPY     • CYSTOSCOPY BLADDER HYDRODISTENSION      with biopsies   • LAPAROSCOPIC CHOLECYSTECTOMY  2016   • OTHER SURGICAL HISTORY      microendoscopic disectomy   • PORTACATH PLACEMENT     • SKIN BIOPSY     • TOTAL ABDOMINAL HYSTERECTOMY WITH SALPINGO OOPHORECTOMY     • TOTAL KNEE ARTHROPLASTY Left    • VENOUS ACCESS DEVICE (PORT) REMOVAL     • VULVECTOMY Left 2014    modified radical vulvectomy with left " "inguinofemoral LND   • VULVECTOMY Right 2/1/2021    Procedure: WIDE LOCAL EXCISION RIGHT PERIURETHRAL LESION, MODIFIED RIGHT RADICAL VULVECTOMY OF SCAR WITH TUMOR,  PERIANAL BIOPSY;  Surgeon: Mikaela Mccauley MD;  Location: [facility];  Service: Gynecology Oncology;  Laterality: Right;         EVALUATION  PT Ortho     Row Name 03/17/21 1330       Subjective Comments    Subjective Comments  Pt reports no issues except some mild skin redness.  States she sometimes has to change the packing several times a day due to its falling out.  -       Subjective Pain    Able to rate subjective pain?  yes  -JM    Pre-Treatment Pain Level  0  -JM    Post-Treatment Pain Level  0  -    Subjective Pain Comment  reports occasional mild pain \"about a 2/10\"  -       Transfers    Sit-Stand [place] (Transfers)  independent  -JM    Stand-Sit [place] (Transfers)  independent  -JM    Comment (Transfers)  supine for tx  -JM       Gait/Stairs (Locomotion)    [place] Level (Gait)  independent  -      User Key  (r) = Recorded By, (t) = Taken By, (c) = Cosigned By    Initials Name Provider Type    Odette Betancourt, PT Physical Therapist          LDA Wound     Row Name 03/17/21 1330             Wound 02/18/21 1200 Right labia Incision    Wound - Properties Group Placement Date: 02/18/21  - Placement Time: 1200  - Present on Hospital Admission: Y  - Side: Right  - Location: labia  -MC Primary Wound Type: Incision  -MC    Wound Image  Images linked: 1  -JM      Dressing Appearance  intact;moist drainage  -      Base  moist;pink;red;yellow;slough;granulating increasing granulation  -      Periwound  intact;dry;pink;redness slight periwound rash  -      Periwound Temperature  warm  -      Periwound Skin Turgor  soft  -      Edges  irregular;open  -JM      Wound Length (cm)  5 cm  -      Wound Width (cm)  0.6 cm  -      Wound Depth (cm)  3.1 cm likely increase due to inter-rater difference  -ELLA      " Drainage Characteristics/Odor  serosanguineous;yellow  -      Drainage Amount  small  -      Care, Wound  cleansed with;wound cleanser;debrided  -      Dressing Care  dressing applied;antimicrobial agent applied;foam saline-moist HFBc, clay-pad  -      Periwound Care  barrier ointment applied;cleansed with pH balanced cleanser;dry periwound area maintained z-gaurd  -ELLA      Retired Wound - Properties Group Date first assessed: 02/18/21  [facility] Time first assessed: [facility]  - Present on Hospital Admission: Y  - Side: Right  - Location: labia  - Primary Wound Type: Incision  -      User Key  (r) = Recorded By, (t) = Taken By, (c) = Cosigned By    Initials Name Provider Type    Gabriella Lau, PT Physical Therapist    Odette Betancourt, PT Physical Therapist            WOUND DEBRIDEMENT  Total area of Debridement: 1cmsq  Debridement Site 1  Location- Site 1: Vulvar wound  Selective Debridement- Site 1: Wound Surface <20cmsq  Instruments- Site 1: tweezers  Excised Tissue Description- Site 1: minimum, slough  Bleeding- Site 1: none             Therapy Education     Row Name 03/17/21 1336             Therapy Education    Education Details  Trial of z-guard to periwound skin irritation, notify MD if redness worsens.  Continue HFB packing.  -ELLA      Given  Symptoms/condition management;Bandaging/dressing change  -      Program  Reinforced  -ELLA      How Provided  Verbal;Demonstration  -ELLA      Provided to  Patient  -ELLA      Level of Understanding  Teach back education performed;Verbalized  -        User Key  (r) = Recorded By, (t) = Taken By, (c) = Cosigned By    Initials Name Provider Type    Odette Betancourt, PT Physical Therapist          Recommendation and Plan  PT Assessment/Plan     Row Name 03/17/21 1887          PT Assessment    Functional Limitations  Impaired locomotion;Limitations in functional capacity and performance;Performance in leisure activities;Performance in self-care  ADL;Other (comment) wound mgmt  -     Impairments  Integumentary integrity;Pain  -     Assessment Comments  R vulvar wound with increasing granulation, now with new epithelial growth noted anterior/lat edge.  Overall wound dimensions stable (differences likely from pt positioning and inter-rater differences).  Pt reporting less pain and drainage.  Wound bed still with min residual slough requiring debridement, PT added z-guard to periwound redness today, pt to monitor for S&S of infection and notify MD if having concerns.  PT will continue with curent goals and POC.  -     Rehab Potential  Good  -     Patient/caregiver participated in establishment of treatment plan and goals  Yes  -     Patient would benefit from skilled therapy intervention  Yes  -        PT Plan    PT Frequency  Other (comment) every 2 weeks  -     Predicted Duration of Therapy Intervention (PT)  8 visits  -     Planned CPT's?  PT VANI DEBRIDE OPEN WOUND UP TO 20 CM: 55840;PT NONSELECT DEBRIDE 15 MIN: 88395;PT SELF CARE/MGMT/TRAIN 15 MIN: 93398  -     Physical Therapy Interventions (Optional Details)  patient/family education;wound care  -     PT Plan Comments  debridement, dressings management  -       User Key  (r) = Recorded By, (t) = Taken By, (c) = Cosigned By    Initials Name Provider Type    Odette Betancourt, PT Physical Therapist          Goals  PT OP Goals     Row Name 03/17/21 1330          PT Short Term Goals    STG 1  Pt will verbalize s/sx of infection.  -     STG 1 Progress  Met  -     STG 2  Pt will demonstrate 25% reduction in wound area to indicate healing progress.  -     STG 2 Progress  Progressing  -        Long Term Goals    LTG 1  Pt will verbalize/demonstrate independence with clean home dressing changes.  -     LTG 1 Progress  Met  -     LTG 2  Pt will demonstrate 75% reduction in wound area to indicate healing progress.  -     LTG 2 Progress  Progressing  -        Time Calculation     PT Goal Re-Cert Due Date  05/19/21  -ELLA       User Key  (r) = Recorded By, (t) = Taken By, (c) = Cosigned By    Initials Name Provider Type    Odette Betancourt, PT Physical Therapist          PT Goal Re-Cert Due Date: 05/19/21  PT Short Term Goals  STG 1: Pt will verbalize s/sx of infection.  STG 1 Progress: Met  STG 2: Pt will demonstrate 25% reduction in wound area to indicate healing progress.  STG 2 Progress: Progressing  Long Term Goals  LTG 1: Pt will verbalize/demonstrate independence with clean home dressing changes.  LTG 1 Progress: Met  LTG 2: Pt will demonstrate 75% reduction in wound area to indicate healing progress.  LTG 2 Progress: Progressing      Time Calculation: Start Time: 1330  Therapy Charges for Today     Code Description Service Date Service Provider Modifiers Qty    [id number] HC VANI DEBRIDE OPEN WOUND UP TO 20CM 3/17/2021 Odette Christopher, PT GP 1                  Odette Christopher PT  3/17/2021

## 2021-03-31 ENCOUNTER — HOSPITAL ENCOUNTER (OUTPATIENT)
Dept: PHYSICAL THERAPY | Facility: HOSPITAL | Age: 71
Setting detail: THERAPIES SERIES
Discharge: HOME | End: 2021-03-31

## 2021-03-31 DIAGNOSIS — S31.40XD OPEN WOUND OF VULVA, SUBSEQUENT ENCOUNTER: Primary | ICD-10-CM

## 2021-03-31 PROCEDURE — 97597 DBRDMT OPN WND 1ST 20 CM/<: CPT

## 2021-04-05 ENCOUNTER — TELEPHONE (OUTPATIENT)
Dept: GYNECOLOGIC ONCOLOGY | Facility: CLINIC | Age: 71
End: 2021-04-05

## 2021-04-30 ENCOUNTER — DOCUMENTATION (OUTPATIENT)
Dept: PHYSICAL THERAPY | Facility: HOSPITAL | Age: 71
End: 2021-04-30

## 2021-04-30 DIAGNOSIS — S31.40XD OPEN WOUND OF VULVA, SUBSEQUENT ENCOUNTER: Primary | ICD-10-CM

## 2021-04-30 NOTE — THERAPY DISCHARGE NOTE
"     Outpatient Rehabilitation - Wound/Debridement Discharge Summary       Patient Name: Lee Ann Cullen  : 1950  MRN: [id number]  Today's Date: 2021                  Admit Date: (Not on file)    Visit Dx:    ICD-10-CM ICD-9-CM   1. Open wound of vulva, subsequent encounter  S31.40XD V58.89     878.4       Patient Active Problem List   Diagnosis   • Vulvar cancer (CMS/HCC)   • Lichen sclerosus of female genitalia   • Screening for colon cancer   • Essential hypertension   • Gastroesophageal reflux disease without esophagitis   • Hypercholesterolemia   • Hypothyroidism   • Recurrent vulvar cancer (CMS/HCC)   • Malignant neoplasm of overlapping sites of vulva (CMS/HCC)        Past Medical History:   Diagnosis Date   • Arthritis    • Bilateral cataracts    • Depression    • Frequent UTI    • GERD (gastroesophageal reflux disease)    • History of basal cell carcinoma     nose   • History of methicillin resistant staphylococcus aureus (MRSA)     \"many years ago\"   • HTN (hypertension)    • Hyperlipidemia    • Hypothyroidism    • IC (interstitial cystitis)     managed by Dr. Funes   • Insomnia    • Lichen sclerosus of female genitalia    • MRSA (methicillin resistant staph aureus) culture positive    • Osteoporosis    • PONV (postoperative nausea and vomiting)    • Seasonal allergies    • Thyroid disease    • Vulvar cancer (CMS/HCC)         Past Surgical History:   Procedure Laterality Date   • APPENDECTOMY     • BACK SURGERY  2016   • CATARACT EXTRACTION Bilateral    • COLONOSCOPY     • CYSTOSCOPY BLADDER HYDRODISTENSION      with biopsies   • LAPAROSCOPIC CHOLECYSTECTOMY  2016   • OTHER SURGICAL HISTORY      microendoscopic disectomy   • PORTACATH PLACEMENT     • SKIN BIOPSY     • TOTAL ABDOMINAL HYSTERECTOMY WITH SALPINGO OOPHORECTOMY     • TOTAL KNEE ARTHROPLASTY Left    • VENOUS ACCESS DEVICE (PORT) REMOVAL     • VULVECTOMY Left 2014    modified radical vulvectomy with left inguinofemoral " LND   • VULVECTOMY Right 2/1/2021    Procedure: WIDE LOCAL EXCISION RIGHT PERIURETHRAL LESION, MODIFIED RIGHT RADICAL VULVECTOMY OF SCAR WITH TUMOR,  PERIANAL BIOPSY;  Surgeon: Mikaela Mccauley MD;  Location: [facility];  Service: Gynecology Oncology;  Laterality: Right;       Goals  PT OP Goals     Row Name 04/30/21 0900          PT Short Term Goals    STG 1  Pt will verbalize s/sx of infection.  -     STG 1 Progress  Met  -     STG 2  Pt will demonstrate 25% reduction in wound area to indicate healing progress.  -     STG 2 Progress  Met  -        Long Term Goals    LTG 1  Pt will verbalize/demonstrate independence with clean home dressing changes.  -     LTG 1 Progress  Met  -     LTG 2  Pt will demonstrate 75% reduction in wound area to indicate healing progress.  -     LTG 2 Progress  Met  -       User Key  (r) = Recorded By, (t) = Taken By, (c) = Cosigned By    Initials Name Provider Type    Odette Betancourt, PT Physical Therapist          OP Discharge Summary     Row Name 04/30/21 0933             OP PT Discharge Summary    Date of Discharge  04/30/21  -      Reason for Discharge  Independent;All goals achieved  -      Outcomes Achieved  Able to achieve all goals within established timeline  -      Discharge Destination  Home without follow-up  -        User Key  (r) = Recorded By, (t) = Taken By, (c) = Cosigned By    Initials Name Provider Type    Odette Betancourt, PT Physical Therapist          Odette Christopher, PT  4/30/2021

## 2021-06-08 NOTE — PROGRESS NOTES
Lee Ann Cullen  [id number]  1950      Reason for visit: History of recurrent vulvar cancer    History of present illness:  The patient is a 70 y.o. year old female who presents today for treatment and evaluation of the above issues.    See below for details regarding history of recurrent vulvar cancer.  Today, patient notes occasional abdominal tenderness.  This is not new. It is intermittent and fairly mild.  She notes that intercourse is not the way it use to be but understands she has had extensive cancer treatment and this is expected.    Oncologic History:  Oncology/Hematology History   Vulvar cancer (CMS/HCC)   3/19/2014 Initial Diagnosis    Moderately differentiated squamous cell carcinoma identified upon biopsies of vulvar lesion and labia minora     4/28/2014 Surgery    Modified radical left vulvectomy and left inguinofemoral LND. Stage IIIB grade 1 by final pathology, 7/10 lymph nodes positive.     5/16/2014 Imaging    PET/CT negative for distant metastases     6/2/2014 - 7/9/2014 Radiation    Pelvis received 45 Gy in 25 fractions with 6 MV photons utilizing IMRT treatment planning     6/2014 - 7/2014 Chemotherapy    Completed concurrent chemotherapy with Cisplatin for radiosensitivity     7/10/2014 - 7/16/2014 Radiation    Tumor bed was boosted with additional 5.4 Gy in 3 fractions with 6 MV photons, for a total of 50.4 Gy     8/21/2014 Imaging    Post-treatment CT scan negative for disease     1/10/2019 Biopsy    Biopsy of new lesion benign, nonspecific ulceration     11/25/2020 Surgery    Wide local excision of right labia majora (Ge[facility]):final pathology revealed a recurrent poorly differentiated neoplasm consistent with recurrent vulvar cancer     1/22/2021 Imaging     PET/CT negative for metastatic disease     2/1/2021 Surgery    Repeat excision due to positive margin from procedure 11/25/2020.  All pathology negative for dysplasia and recurrence.           Past Medical History:   Diagnosis Date  "  • Arthritis    • Bilateral cataracts    • Depression    • Frequent UTI    • GERD (gastroesophageal reflux disease)    • History of basal cell carcinoma     nose   • History of methicillin resistant staphylococcus aureus (MRSA)     \"many years ago\"   • HTN (hypertension)    • Hyperlipidemia    • Hypothyroidism    • IC (interstitial cystitis)     managed by Dr. Funes   • Insomnia    • Lichen sclerosus of female genitalia    • MRSA (methicillin resistant staph aureus) culture positive    • Osteoporosis    • PONV (postoperative nausea and vomiting)    • Seasonal allergies    • Thyroid disease    • Vulvar cancer (CMS/HCC)        Past Surgical History:   Procedure Laterality Date   • APPENDECTOMY  1992   • BACK SURGERY  03/2016   • CATARACT EXTRACTION Bilateral    • COLONOSCOPY     • CYSTOSCOPY BLADDER HYDRODISTENSION      with biopsies   • LAPAROSCOPIC CHOLECYSTECTOMY  05/2016   • OTHER SURGICAL HISTORY      microendoscopic disectomy   • PORTACATH PLACEMENT     • SKIN BIOPSY     • TOTAL ABDOMINAL HYSTERECTOMY WITH SALPINGO OOPHORECTOMY     • TOTAL KNEE ARTHROPLASTY Left 2010   • VENOUS ACCESS DEVICE (PORT) REMOVAL     • VULVECTOMY Left 04/2014    modified radical vulvectomy with left inguinofemoral LND   • VULVECTOMY Right 2/1/2021    Procedure: WIDE LOCAL EXCISION RIGHT PERIURETHRAL LESION, MODIFIED RIGHT RADICAL VULVECTOMY OF SCAR WITH TUMOR,  PERIANAL BIOPSY;  Surgeon: Mikaela Mccauley MD;  Location: [facility];  Service: Gynecology Oncology;  Laterality: Right;       MEDICATIONS: The current medication list was reviewed with the patient and updated in the EMR this date per the Medical Assistant. Medication dosages and frequencies were confirmed to be accurate.      Allergies:  is allergic to phenergan [promethazine hcl], augmentin [amoxicillin-pot clavulanate], and hydrocodone-acetaminophen.    Social History:   Social History     Socioeconomic History   • Marital status:      Spouse name: Not on file   • " "Number of children: 2   • Years of education: Not on file   • Highest education level: Not on file   Tobacco Use   • Smoking status: Never Smoker   • Smokeless tobacco: Never Used   Vaping Use   • Vaping Use: Never used   Substance and Sexual Activity   • Alcohol use: No   • Drug use: No   • Sexual activity: Yes     Partners: Male     Birth control/protection: Surgical       Family History:    Family History   Problem Relation Age of Onset   • Heart disease Mother    • Diabetes Mother    • Skin cancer Mother    • Heart disease Father         congestive heart failure   • Heart disease Other         5 brothers   • Breast cancer Maternal Aunt 50       Health Maintenance:    Health Maintenance   Topic Date Due   • TDAP/TD VACCINES (1 - Tdap) 10/08/1970   • ZOSTER VACCINE (1 of 2) Never done   • DXA SCAN  01/01/2010   • Pneumococcal Vaccine 65+ (1 of 1 - PPSV23) Never done   • HEPATITIS C SCREENING  Never done   • ANNUAL WELLNESS VISIT  Never done   • MAMMOGRAM  08/07/2017   • LIPID PANEL  Never done   • INFLUENZA VACCINE  08/01/2021   • COLORECTAL CANCER SCREENING  12/12/2028   • COVID-19 Vaccine  Completed         Review of Systems   Constitutional: Positive for fatigue.   Genitourinary: Positive for frequency.   Psychiatric/Behavioral: The patient is nervous/anxious.        Physical Exam    Vitals:    06/09/21 1011   BP: 142/68  Comment: LUE   Pulse: 65   Resp: 18   Temp: 97.1 °F (36.2 °C)   TempSrc: Infrared   SpO2: 99%  Comment: RA   Weight: 67.1 kg (148 lb)   Height: 167.6 cm (66\")   PainSc: 0-No pain       Body mass index is 23.89 kg/m².    Wt Readings from Last 3 Encounters:   06/09/21 67.1 kg (148 lb)   02/10/21 68.9 kg (152 lb)   02/01/21 68.9 kg (152 lb)     GENERAL: Alert, well-appearing female appearing her stated age who is in no apparent distress.   NEUROLOGIC: No focal deficits. Moves extremities well.  MUSCULOSKELETAL: Normal gait and station.   EXTREMITIES:   No cyanosis, clubbing, " symmetric.  LYMPHATICS:  No cervical or inguinal adenopathy noted.     PELVIC exam:    External genitalia remarkable for mixed skin changes of pale/red consistent with prior surgical interventions and radiation.  No suspicious lesions noted.    ECOG PS 0    PROCEDURES: None    Diagnostic Data:      Lab Results   Component Value Date    WBC 9.27 02/02/2021    HGB 13.2 02/02/2021    HCT 41.0 02/02/2021    MCV 93.0 02/02/2021     02/02/2021    NEUTROABS 7.36 (H) 02/02/2021    GLUCOSE 80 02/02/2021    BUN 13 02/02/2021    CREATININE 0.89 02/02/2021    EGFRIFNONA 63 02/02/2021     02/02/2021    K 3.9 02/02/2021     02/02/2021    CO2 15.0 (L) 02/02/2021    MG 1.1 (L) 07/25/2014    PHOS 2.0 (L) 07/14/2014    CALCIUM 8.9 02/02/2021    ALBUMIN 4.30 01/29/2021    AST 26 01/29/2021    ALT 20 01/29/2021    BILITOT 1.0 01/29/2021     No results found for:         Assessment/Plan   This is a 70 y.o. woman recurrent vulvar cancer.  Encounter Diagnoses   Name Primary?   • Vulvar cancer (CMS/HCC) Yes   • Recurrent vulvar cancer (CMS/HCC)      Recurrent Vulvar Squamous Cell Carcinoma  No evidence of recurrent disease.    Continue observation.  Patient reassured that local recurrences are curable with surgical management. PET/CT negative for metastatic disease earlier this year.     Pain assessment was performed today as a part of patient’s care.  For patients with pain related to surgery, gynecologic malignancy or cancer treatment, the plan is as noted in the assessment/plan.  For patients with pain not related to these issues, they are to seek any further needed care from a more appropriate provider, such as PCP.      No orders of the defined types were placed in this encounter.      FOLLOW UP: 3 months    Mikaela Mccauley MD  06/09/21  11:40 EST

## 2021-06-09 ENCOUNTER — OFFICE VISIT (OUTPATIENT)
Dept: GYNECOLOGIC ONCOLOGY | Facility: CLINIC | Age: 71
End: 2021-06-09

## 2021-06-09 VITALS
BODY MASS INDEX: 23.78 KG/M2 | HEIGHT: 66 IN | SYSTOLIC BLOOD PRESSURE: 142 MMHG | DIASTOLIC BLOOD PRESSURE: 68 MMHG | TEMPERATURE: 97.1 F | WEIGHT: 148 LBS | HEART RATE: 65 BPM | OXYGEN SATURATION: 99 % | RESPIRATION RATE: 18 BRPM

## 2021-06-09 DIAGNOSIS — C51.9 RECURRENT VULVAR CANCER (HCC): ICD-10-CM

## 2021-06-09 DIAGNOSIS — C51.9 VULVAR CANCER (HCC): Primary | ICD-10-CM

## 2021-06-09 PROCEDURE — 99213 OFFICE O/P EST LOW 20 MIN: CPT | Performed by: OBSTETRICS & GYNECOLOGY

## 2021-09-16 ENCOUNTER — OFFICE VISIT (OUTPATIENT)
Dept: GYNECOLOGIC ONCOLOGY | Facility: CLINIC | Age: 71
End: 2021-09-16

## 2021-09-16 VITALS
BODY MASS INDEX: 22.18 KG/M2 | HEIGHT: 66 IN | RESPIRATION RATE: 18 BRPM | SYSTOLIC BLOOD PRESSURE: 172 MMHG | TEMPERATURE: 97.5 F | OXYGEN SATURATION: 97 % | HEART RATE: 58 BPM | WEIGHT: 138 LBS | DIASTOLIC BLOOD PRESSURE: 79 MMHG

## 2021-09-16 DIAGNOSIS — C51.9 VULVAR CANCER (HCC): Primary | ICD-10-CM

## 2021-09-16 DIAGNOSIS — C51.9 RECURRENT VULVAR CANCER (HCC): ICD-10-CM

## 2021-09-16 PROBLEM — C51.8: Status: RESOLVED | Noted: 2021-01-28 | Resolved: 2021-09-16

## 2021-09-16 PROCEDURE — 99212 OFFICE O/P EST SF 10 MIN: CPT | Performed by: NURSE PRACTITIONER

## 2022-03-16 ENCOUNTER — OFFICE VISIT (OUTPATIENT)
Dept: GYNECOLOGIC ONCOLOGY | Facility: CLINIC | Age: 72
End: 2022-03-16

## 2022-03-16 VITALS
DIASTOLIC BLOOD PRESSURE: 74 MMHG | SYSTOLIC BLOOD PRESSURE: 140 MMHG | TEMPERATURE: 97.8 F | WEIGHT: 147 LBS | RESPIRATION RATE: 18 BRPM | BODY MASS INDEX: 23.63 KG/M2 | OXYGEN SATURATION: 99 % | HEIGHT: 66 IN | HEART RATE: 68 BPM

## 2022-03-16 DIAGNOSIS — L98.9 SKIN LESION OF RIGHT LEG: ICD-10-CM

## 2022-03-16 DIAGNOSIS — Z85.40 HISTORY OF FEMALE GENITAL CANCER: Primary | ICD-10-CM

## 2022-03-16 DIAGNOSIS — N90.4 LICHEN SCLEROSUS OF FEMALE GENITALIA: ICD-10-CM

## 2022-03-16 PROCEDURE — 99213 OFFICE O/P EST LOW 20 MIN: CPT | Performed by: NURSE PRACTITIONER

## 2022-03-16 NOTE — PROGRESS NOTES
"GYN ONCOLOGY CANCER SURVEILLANCE FOLLOW-UP    Lee Ann Cullen  [id number]  1950    Subjective   Chief Complaint: Vulvar Cancer (Pt c/o friction burn outside vagina)        History of present illness:     Lee Ann Cullen is a 71 y.o. year old female who is here today for ongoing surveillance of recurrent Vulvar Cancer, see Cancer History. She is over 1 year out from last surgery with positive pathology. Follow-up excision in 2/201 was negative with negative margins. Upon arrival today she c/o \"friction burn\" to external genitalia. She denies concerning lesions, vulvar itching/burning, vaginal bleeding, pelvic pain, and changes in bowel or bladder function. She has been alternating follow-up visits between Gyn Oncology and Dr. Funes, last seen by GYN in 12/2021 with negative pap smear. She does not have a follow-up scheduled there yet as their office is moving.            Cancer History:   Oncology/Hematology History   Vulvar cancer (HCC)   3/19/2014 Initial Diagnosis    Moderately differentiated squamous cell carcinoma identified upon biopsies of vulvar lesion and labia minora     4/28/2014 Surgery    Modified radical left vulvectomy and left inguinofemoral LND. Stage IIIB grade 1 by final pathology, 7/10 lymph nodes positive.     5/16/2014 Imaging    PET/CT negative for distant metastases     6/2/2014 - 7/9/2014 Radiation    Pelvis received 45 Gy in 25 fractions with 6 MV photons utilizing IMRT treatment planning     6/2014 - 7/2014 Chemotherapy    Completed concurrent chemotherapy with Cisplatin for radiosensitivity     7/10/2014 - 7/16/2014 Radiation    Tumor bed was boosted with additional 5.4 Gy in 3 fractions with 6 MV photons, for a total of 50.4 Gy     8/21/2014 Imaging    Post-treatment CT scan negative for disease     1/10/2019 Biopsy    Biopsy of new lesion benign, nonspecific ulceration     11/25/2020 Surgery    Wide local excision of right labia majora (Geiler):final pathology revealed a recurrent poorly " "differentiated neoplasm consistent with recurrent vulvar cancer     1/22/2021 Imaging     PET/CT negative for metastatic disease     2/1/2021 Surgery    Repeat excision due to positive margin from procedure 11/25/2020.  All pathology negative for dysplasia and recurrence.           The current medication list and allergy list were reviewed and reconciled.     Past Medical History, Past Surgical History, Social History, Family History have been reviewed and are without significant changes except as mentioned.      Review of Systems   Constitutional: Negative.    Gastrointestinal: Negative.    Genitourinary: Negative.          Objective   Physical Exam  Vital Signs: /74 Comment: LUE  Pulse 68   Temp 97.8 °F (36.6 °C) (Infrared)   Resp 18   Ht 167.6 cm (66\")   Wt 66.7 kg (147 lb)   SpO2 99% Comment: RA  BMI 23.73 kg/m²    Vitals:    03/16/22 1325   PainSc: 0-No pain           General Appearance:  alert, cooperative, no apparent distress, appears stated age and normal weight   Neurologic/Psychiatric: A&O x 3, gait steady, appropriate affect   HEENT:  Normocephalic, without obvious abnormality, mucous membranes moist   Abdomen:   Soft, non-tender, non-distended and no organomegaly   Lymph nodes: No cervical, supraclavicular, inguinal adenopathy noted   Skin: 1 cm raised puritic lesion noted to lateral lower right leg   Pelvic: External Genitalia  remarkable for changes consistent with previous radiation, extensive vulvectomy, and repeat excisions. No suspicious lesions or masses noted today. Generalized redness consistent with local skin irritation  Vagina  is pale, atrophic.  and changes consistent with previous radiation.  Vaginal Cuff  Female Vaginal Cuff: smooth, intact and without visible lesions  Uterus  surgically absent and no palpable masses  Ovaries  surgically absent bilaterally  Parametria  smooth     ECOG score: 0             Result Review :   Last imaging study was PET 1/22/2021.     Procedure " Note:            Assessment and Plan:    Diagnoses and all orders for this visit:    1. History of female genital cancer (Primary)    2. Lichen sclerosus of female genitalia    3. Skin lesion of right leg  -     Ambulatory Referral to Dermatology          There is no evidence of disease upon today's exam. Continue every 3 monthcancer surveillance until 2 years out from last surgery. Patient reassured no abnormalities upon exam today. Encouraged barrier topical such as Aquaphor or Desitin for skin protectant, clobetasol sparingly for lichens flares only. She is understanding to call with any changes in pelvic symptoms or general GYN concerns at any time between regularly scheduled visits.     Patient and I discussed lesion to right leg. This is somewhat suspicious and warrants evaluation by dermatology. Referral ordered today.     Pain assessment was performed today as a part of patient’s care.  For patients with pain related to surgery, gynecologic malignancy or cancer treatment, the plan is as noted in the assessment/plan.  For patients with pain not related to these issues, they are to seek any further needed care from a more appropriate provider, such as PCP.      Follow-up:     Return to clinic in 3 months for ongoing cancer surveillance.      Electronically signed by JOHNNY Rodriguez on 03/16/22 at 14:00 EDT

## 2022-06-16 ENCOUNTER — OFFICE VISIT (OUTPATIENT)
Dept: GYNECOLOGIC ONCOLOGY | Facility: CLINIC | Age: 72
End: 2022-06-16

## 2022-06-16 ENCOUNTER — LAB (OUTPATIENT)
Dept: LAB | Facility: HOSPITAL | Age: 72
End: 2022-06-16

## 2022-06-16 VITALS
HEART RATE: 64 BPM | TEMPERATURE: 97.8 F | HEIGHT: 66 IN | RESPIRATION RATE: 18 BRPM | BODY MASS INDEX: 24.35 KG/M2 | WEIGHT: 151.5 LBS | SYSTOLIC BLOOD PRESSURE: 152 MMHG | DIASTOLIC BLOOD PRESSURE: 70 MMHG

## 2022-06-16 DIAGNOSIS — Z85.40 HISTORY OF FEMALE GENITAL CANCER: Primary | ICD-10-CM

## 2022-06-16 DIAGNOSIS — R30.0 DYSURIA: ICD-10-CM

## 2022-06-16 DIAGNOSIS — N90.4 LICHEN SCLEROSUS OF FEMALE GENITALIA: ICD-10-CM

## 2022-06-16 LAB
BACTERIA UR QL AUTO: ABNORMAL /HPF
BILIRUB UR QL STRIP: NEGATIVE
CLARITY UR: CLEAR
COLOR UR: YELLOW
GLUCOSE UR STRIP-MCNC: NEGATIVE MG/DL
HGB UR QL STRIP.AUTO: NEGATIVE
HYALINE CASTS UR QL AUTO: ABNORMAL /LPF
KETONES UR QL STRIP: NEGATIVE
LEUKOCYTE ESTERASE UR QL STRIP.AUTO: ABNORMAL
NITRITE UR QL STRIP: NEGATIVE
PH UR STRIP.AUTO: 6.5 [PH] (ref 5–8)
PROT UR QL STRIP: NEGATIVE
RBC # UR STRIP: ABNORMAL /HPF
REF LAB TEST METHOD: ABNORMAL
SP GR UR STRIP: 1.01 (ref 1–1.03)
SQUAMOUS #/AREA URNS HPF: ABNORMAL /HPF
UROBILINOGEN UR QL STRIP: ABNORMAL
WBC # UR STRIP: ABNORMAL /HPF

## 2022-06-16 PROCEDURE — 99213 OFFICE O/P EST LOW 20 MIN: CPT | Performed by: NURSE PRACTITIONER

## 2022-06-16 PROCEDURE — 81001 URINALYSIS AUTO W/SCOPE: CPT

## 2022-06-16 NOTE — PROGRESS NOTES
GYN ONCOLOGY CANCER SURVEILLANCE FOLLOW-UP    Lee Ann Cullen  [id number]  1950    Subjective   Chief Complaint: Vulvar Cancer        History of present illness:     Lee Ann Cullen is a 71 y.o. year old female who is here today for ongoing surveillance of recurrent Vulvar Cancer, see Cancer History. She is 1.5 years out from last surgery with positive pathology. Follow-up excision in 2/2021 was negative with negative margins. Upon arrival today she c/o intermittent bladder discomfort, requests UA to rule out UTI. She denies concerning lesions, vulvar itching/burning, vaginal bleeding, pelvic pain, and changes in bowel or bladder function. She has been alternating follow-up visits between Gyn Oncology and Dr. Funes, last seen by GYN in 12/2021 with negative pap smear. She does not have a follow-up scheduled there yet as their office is moving.            Cancer History:   Oncology/Hematology History   Vulvar cancer (HCC)   3/19/2014 Initial Diagnosis    Moderately differentiated squamous cell carcinoma identified upon biopsies of vulvar lesion and labia minora     4/28/2014 Surgery    Modified radical left vulvectomy and left inguinofemoral LND. Stage IIIB grade 1 by final pathology, 7/10 lymph nodes positive.     5/16/2014 Imaging    PET/CT negative for distant metastases     6/2/2014 - 7/9/2014 Radiation    Pelvis received 45 Gy in 25 fractions with 6 MV photons utilizing IMRT treatment planning     6/2014 - 7/2014 Chemotherapy    Completed concurrent chemotherapy with Cisplatin for radiosensitivity     7/10/2014 - 7/16/2014 Radiation    Tumor bed was boosted with additional 5.4 Gy in 3 fractions with 6 MV photons, for a total of 50.4 Gy     8/21/2014 Imaging    Post-treatment CT scan negative for disease     1/10/2019 Biopsy    Biopsy of new lesion benign, nonspecific ulceration     11/25/2020 Surgery    Wide local excision of right labia majora (Geiler):final pathology revealed a recurrent poorly differentiated  "neoplasm consistent with recurrent vulvar cancer     1/22/2021 Imaging     PET/CT negative for metastatic disease     2/1/2021 Surgery    Repeat excision due to positive margin from procedure 11/25/2020.  All pathology negative for dysplasia and recurrence.           The current medication list and allergy list were reviewed and reconciled.     Past Medical History, Past Surgical History, Social History, Family History have been reviewed and are without significant changes except as mentioned.      Review of Systems   Constitutional: Negative.    Gastrointestinal: Negative.    Genitourinary: Negative.          Objective   Physical Exam  Vital Signs: /70   Pulse 64   Temp 97.8 °F (36.6 °C)   Resp 18   Ht 167.6 cm (66\")   Wt 68.7 kg (151 lb 8 oz)   BMI 24.45 kg/m²    Vitals:    06/16/22 1109   PainSc: 0-No pain           General Appearance:  alert, cooperative, no apparent distress, appears stated age and normal weight   Neurologic/Psychiatric: A&O x 3, gait steady, appropriate affect   HEENT:  Normocephalic, without obvious abnormality, mucous membranes moist   Back: No CVA tenderness   Abdomen:   Soft, non-tender, non-distended and no organomegaly   Lymph nodes: No cervical, supraclavicular, inguinal adenopathy noted   Pelvic: External Genitalia  remarkable for changes consistent with previous radiation, extensive vulvectomy, and repeat excisions, and generalized lichen sclerosus. No suspicious lesions or masses noted today. Generalized redness mild, improved since last visit  Vagina  is pale, atrophic.  and changes consistent with previous radiation.  Vaginal Cuff  Female Vaginal Cuff: smooth, intact and without visible lesions  Uterus  surgically absent and no palpable masses  Ovaries  surgically absent bilaterally  Parametria  smooth     ECOG score: 0             Result Review :   Last imaging study was PET 1/22/2021.     Procedure Note:            Assessment and Plan:    Diagnoses and all orders for " this visit:    1. History of female genital cancer (Primary)    2. Lichen sclerosus of female genitalia    3. Dysuria  -     Urinalysis With Culture If Indicated -; Future          There is no evidence of disease upon today's exam. Continue every 3 monthcancer surveillance until 2 years out from last surgery. Patient reassured no abnormalities upon exam today. Continue barrier topical for skin protectant, clobetasol sparingly for lichens flares only. She is understanding to call with any changes in pelvic symptoms or general GYN concerns at any time between regularly scheduled visits.     Patient and I discussed occasional bladder discomfort, no acute symptoms today. UA ordered. She will be notified of all results upon their return.     Pain assessment was performed today as a part of patient’s care.  For patients with pain related to surgery, gynecologic malignancy or cancer treatment, the plan is as noted in the assessment/plan.  For patients with pain not related to these issues, they are to seek any further needed care from a more appropriate provider, such as PCP.      Follow-up:     Return to clinic in 3 months for ongoing cancer surveillance.      Electronically signed by JOHNNY Rodriguez on 06/29/22 at 09:23 EDT

## 2022-06-17 ENCOUNTER — TELEPHONE (OUTPATIENT)
Dept: GYNECOLOGIC ONCOLOGY | Facility: CLINIC | Age: 72
End: 2022-06-17

## 2022-06-17 ENCOUNTER — TELEPHONE (OUTPATIENT)
Dept: ONCOLOGY | Facility: OTHER | Age: 72
End: 2022-06-17

## 2022-06-17 NOTE — TELEPHONE ENCOUNTER
Patient called back for results of UA. Advised it was negative for infection. She verbalized understanding.

## 2022-08-15 ENCOUNTER — OFFICE VISIT (OUTPATIENT)
Dept: UROLOGY | Facility: CLINIC | Age: 72
End: 2022-08-15

## 2022-08-15 VITALS
SYSTOLIC BLOOD PRESSURE: 132 MMHG | WEIGHT: 151.5 LBS | BODY MASS INDEX: 24.35 KG/M2 | HEIGHT: 66 IN | HEART RATE: 66 BPM | TEMPERATURE: 98.2 F | OXYGEN SATURATION: 98 % | DIASTOLIC BLOOD PRESSURE: 80 MMHG

## 2022-08-15 DIAGNOSIS — N39.41 URGE INCONTINENCE: ICD-10-CM

## 2022-08-15 DIAGNOSIS — R35.0 FREQUENCY OF URINATION: Primary | ICD-10-CM

## 2022-08-15 LAB
BILIRUB BLD-MCNC: NEGATIVE MG/DL
CLARITY, POC: ABNORMAL
COLOR UR: YELLOW
EXPIRATION DATE: ABNORMAL
GLUCOSE UR STRIP-MCNC: NEGATIVE MG/DL
KETONES UR QL: NEGATIVE
LEUKOCYTE EST, POC: ABNORMAL
Lab: ABNORMAL
NITRITE UR-MCNC: NEGATIVE MG/ML
PH UR: 6.5 [PH] (ref 5–8)
PROT UR STRIP-MCNC: ABNORMAL MG/DL
RBC # UR STRIP: NEGATIVE /UL
SP GR UR: 1.01 (ref 1–1.03)
UROBILINOGEN UR QL: NORMAL

## 2022-08-15 PROCEDURE — 51798 US URINE CAPACITY MEASURE: CPT | Performed by: NURSE PRACTITIONER

## 2022-08-15 PROCEDURE — 81003 URINALYSIS AUTO W/O SCOPE: CPT | Performed by: NURSE PRACTITIONER

## 2022-08-15 PROCEDURE — 99214 OFFICE O/P EST MOD 30 MIN: CPT | Performed by: NURSE PRACTITIONER

## 2022-08-15 RX ORDER — VIBEGRON 75 MG/1
75 TABLET, FILM COATED ORAL DAILY
Qty: 90 TABLET | Refills: 4 | Status: SHIPPED | OUTPATIENT
Start: 2022-08-15

## 2022-08-15 NOTE — PROGRESS NOTES
"       Office Visit Female LUTS     Patient Name: Lee Ann Cullen  : 1950   MRN: [id number]     Chief Complaint:   Chief Complaint   Patient presents with   • Urinary Frequency       Referring Provider: Woody Logan*    History of Present Illness: Lee Ann Cullen is a 71 y.o. female who presents today with complaint of urge urinary incontinence and frequency for 1 or more years. 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  Has Lichen sclerosis followed by GYN Onc every 3 mos.     Pt has primarily urge urinary incontinence.     Severity: Pt uses 0 pads a day told not to wear due to hold moisture where had vulvar ca, and has tried Mybetriq in the past  Associated Sx: Pt has  h/o daytime frequency (aporx 12 times of more daily), urgency and nocturia about every 2 hours      No h/o poor stream, straining, hesitancy or incomplete voiding.     No h/o recurrent UTI, hematuria, nephrolithiasis, or burning with urination      No vaginal discharge or bleeding.  No h/o something coming out of vagina   No Constipation  2 Vaginal deliveries h/o hysterectomy    Subjective      Review of System:   Constitutional: No fevers or chills  Respiratory: Negative for shortness of breath or wheezing  Gastrointestinal: No constipation, nausea or vomiting  Genitourinary: Negative for new lower urinary tract symptoms accept listed above, current gross hematuria or dysuria.    Past Medical History:   Past Medical History:   Diagnosis Date   • Arthritis    • Bilateral cataracts    • Depression    • Frequent UTI    • GERD (gastroesophageal reflux disease)    • History of basal cell carcinoma     nose   • History of methicillin resistant staphylococcus aureus (MRSA)     \"many years ago\"   • HTN (hypertension)    • Hyperlipidemia    • Hypothyroidism    • IC (interstitial cystitis)     managed by Dr. Funes   • Insomnia    • Lichen sclerosus of female genitalia    • MRSA (methicillin resistant staph aureus) culture " positive    • Osteoporosis    • PONV (postoperative nausea and vomiting)    • Seasonal allergies    • Thyroid disease    • Vulvar cancer (HCC)        Past Surgical History:   Past Surgical History:   Procedure Laterality Date   • APPENDECTOMY  1992   • BACK SURGERY  03/2016   • CATARACT EXTRACTION Bilateral    • COLONOSCOPY     • CYSTOSCOPY BLADDER HYDRODISTENSION      with biopsies   • LAPAROSCOPIC CHOLECYSTECTOMY  05/2016   • OTHER SURGICAL HISTORY      microendoscopic disectomy   • PORTACATH PLACEMENT     • SKIN BIOPSY     • TOTAL ABDOMINAL HYSTERECTOMY WITH SALPINGO OOPHORECTOMY     • TOTAL KNEE ARTHROPLASTY Left 2010   • TOTAL KNEE ARTHROPLASTY Right 07/28/2021   • VENOUS ACCESS DEVICE (PORT) REMOVAL     • VULVECTOMY Left 04/2014    modified radical vulvectomy with left inguinofemoral LND   • VULVECTOMY Right 2/1/2021    Procedure: WIDE LOCAL EXCISION RIGHT PERIURETHRAL LESION, MODIFIED RIGHT RADICAL VULVECTOMY OF SCAR WITH TUMOR,  PERIANAL BIOPSY;  Surgeon: Mikaela Mccauley MD;  Location: [facility];  Service: Gynecology Oncology;  Laterality: Right;       Family History:   Family History   Problem Relation Age of Onset   • Heart disease Mother    • Diabetes Mother    • Skin cancer Mother    • Heart disease Father         congestive heart failure   • Heart disease Other         5 brothers   • Breast cancer Maternal Aunt 50       Social History:   Social History     Socioeconomic History   • Marital status:    • Number of children: 2   Tobacco Use   • Smoking status: Never Smoker   • Smokeless tobacco: Never Used   Vaping Use   • Vaping Use: Never used   Substance and Sexual Activity   • Alcohol use: No   • Drug use: No   • Sexual activity: Yes     Partners: Male     Birth control/protection: Surgical       Medications:     Current Outpatient Medications:   •  amitriptyline (ELAVIL) 10 MG tablet, Take 10 mg by mouth every night., Disp: , Rfl:   •  atorvastatin (LIPITOR) 20 MG tablet, Take 20 mg by mouth  "Every Night., Disp: , Rfl:   •  cetirizine (ZyrTEC) 10 MG tablet, Take 10 mg by mouth daily., Disp: , Rfl:   •  Cholecalciferol (VITAMIN D-3 PO), Take 2,000 Units by mouth Daily., Disp: , Rfl:   •  clobetasol (TEMOVATE) 0.05 % cream, Apply  topically 2 (Two) Times a Day. (Patient taking differently: Apply 1 application topically to the appropriate area as directed 2 (Two) Times a Day.), Disp: 60 g, Rfl: 5  •  estradiol (ESTRACE) 0.5 MG tablet, Take 0.5 mg by mouth daily., Disp: , Rfl:   •  fluticasone (FLONASE) 50 MCG/ACT nasal spray, 2 sprays into the nostril(s) as directed by provider Daily. Administer 2 sprays in each nostril for each dose., Disp: , Rfl:   •  levothyroxine (SYNTHROID, LEVOTHROID) 112 MCG tablet, Take 56 mcg by mouth Daily., Disp: , Rfl:   •  losartan (COZAAR) 100 MG tablet, Take 50 mg by mouth Daily., Disp: , Rfl:   •  magnesium oxide (MAG-OX) 400 MG tablet, Take 400 mg by mouth 3 (Three) Times a Week., Disp: , Rfl:   •  omeprazole (priLOSEC) 20 MG capsule, Take 20 mg by mouth Daily., Disp: , Rfl:     Allergies:   Allergies   Allergen Reactions   • Phenergan [Promethazine Hcl] Anaphylaxis     Involuntary muscular movements   • Augmentin [Amoxicillin-Pot Clavulanate] Itching   • Hydrocodone-Acetaminophen Irritability       Objective     Physical Exam:     Constitutional: NAD, WDWN.   Neurological: A + O x 3  Psych: Normal mood and affect    Vital Signs:   Vitals:    08/15/22 0939   BP: 132/80   BP Location: Left arm   Patient Position: Sitting   Cuff Size: Adult   Pulse: 66   Temp: 98.2 °F (36.8 °C)   TempSrc: Temporal   SpO2: 98%   Weight: 68.7 kg (151 lb 8 oz)   Height: 167.6 cm (66\")     Body mass index is 24.45 kg/m².     Labs  Brief Urine Lab Results  (Last result in the past 365 days)      Color   Clarity   Blood   Leuk Est   Nitrite   Protein   CREAT   Urine HCG        08/15/22 0942 Yellow   Slightly Cloudy   Negative   Large (3+)   Negative   Trace                 Lab Results   Component " Value Date    GLUCOSE 80 02/02/2021    CALCIUM 8.9 02/02/2021     02/02/2021    K 3.9 02/02/2021    CO2 15.0 (L) 02/02/2021     02/02/2021    BUN 13 02/02/2021    CREATININE 0.89 02/02/2021    EGFRIFNONA 63 02/02/2021    BCR 14.6 02/02/2021    ANIONGAP 14.0 02/02/2021       Lab Results   Component Value Date    WBC 9.27 02/02/2021    HGB 13.2 02/02/2021    HCT 41.0 02/02/2021    MCV 93.0 02/02/2021     02/02/2021       PVR  Post-void residual performed by staff - 0ml    I have personally reviewed her labs and post void residual imaging.     Assessment / Plan    Assessment  Ms. Cullen is a 71 y.o. female with HTN, history of vulvar cancer and genital lichen sclerosus with primary urge urinary incontinence and frequency.  Has tried Myrbetriq in the past and failed caused very dry mouth and throat so she stopped taking.     We discussed the AUA guidelines for urge urinary incontinence.  We discussed lifestyle changes such as weight reduction and caffeine reduction, as well as medications such as anticholinergics and beta agonist.  We discussed third line therapies, such as Botox, InterStim, and PTNS.  The patient has elected 1 month trial of Gemtesa samples were given, she would prefer starting with medications before more invasive procedures.  We discussed the risks, benefits, and alternatives to this approach.  She voiced her understanding and wished to proceed.    Plan  1.  Trial of Gemtesa, prescription sent to mail order pharmacy, follow-up in 1 month    Follow Up:   No follow-ups on file.    JOHNNY Chanel  [facility]

## 2022-09-27 ENCOUNTER — TELEPHONE (OUTPATIENT)
Dept: GYNECOLOGIC ONCOLOGY | Facility: CLINIC | Age: 72
End: 2022-09-27

## 2022-09-27 NOTE — TELEPHONE ENCOUNTER
Provider: DR ORONA  Caller: JESS    Reason for Call: JESS IS CALLING TO SCHEDULE HER ONE YEAR FOLLOW UP      PLEASE ADVISE

## 2022-10-06 ENCOUNTER — OFFICE VISIT (OUTPATIENT)
Dept: GYNECOLOGIC ONCOLOGY | Facility: CLINIC | Age: 72
End: 2022-10-06

## 2022-10-06 VITALS
DIASTOLIC BLOOD PRESSURE: 78 MMHG | TEMPERATURE: 97.5 F | RESPIRATION RATE: 16 BRPM | OXYGEN SATURATION: 97 % | SYSTOLIC BLOOD PRESSURE: 181 MMHG | BODY MASS INDEX: 25.18 KG/M2 | HEART RATE: 66 BPM | WEIGHT: 156 LBS

## 2022-10-06 DIAGNOSIS — Z85.40 HISTORY OF FEMALE GENITAL CANCER: Primary | ICD-10-CM

## 2022-10-06 DIAGNOSIS — N90.4 LICHEN SCLEROSUS OF FEMALE GENITALIA: ICD-10-CM

## 2022-10-06 PROCEDURE — 99212 OFFICE O/P EST SF 10 MIN: CPT | Performed by: NURSE PRACTITIONER

## 2022-10-06 NOTE — PROGRESS NOTES
GYN ONCOLOGY CANCER SURVEILLANCE FOLLOW-UP    Lee Ann Cullen  [id number]  1950    Subjective   Chief Complaint: Vulvar Cancer (No complaints)        History of present illness:     Lee Ann Cullen is a 71 y.o. year old female who is here today for ongoing surveillance of recurrent Vulvar Cancer, see Cancer History. She is approaching 2 years out from last surgery with positive pathology. Follow-up excision in 2/2021 was negative with negative margins. She denies concerning lesions, vulvar itching/burning, vaginal bleeding, pelvic pain, and changes in bowel or bladder function. She has been alternating follow-up visits between Gyn Oncology and rufus Simon for annual exam with general GYN this winter.           Cancer History:   Oncology/Hematology History   Vulvar cancer (HCC)   3/19/2014 Initial Diagnosis    Moderately differentiated squamous cell carcinoma identified upon biopsies of vulvar lesion and labia minora     4/28/2014 Surgery    Modified radical left vulvectomy and left inguinofemoral LND. Stage IIIB grade 1 by final pathology, 7/10 lymph nodes positive.     5/16/2014 Imaging    PET/CT negative for distant metastases     6/2/2014 - 7/9/2014 Radiation    Pelvis received 45 Gy in 25 fractions with 6 MV photons utilizing IMRT treatment planning     6/2014 - 7/2014 Chemotherapy    Completed concurrent chemotherapy with Cisplatin for radiosensitivity     7/10/2014 - 7/16/2014 Radiation    Tumor bed was boosted with additional 5.4 Gy in 3 fractions with 6 MV photons, for a total of 50.4 Gy     8/21/2014 Imaging    Post-treatment CT scan negative for disease     1/10/2019 Biopsy    Biopsy of new lesion benign, nonspecific ulceration     11/25/2020 Surgery    Wide local excision of right labia majora ([facility]):final pathology revealed a recurrent poorly differentiated neoplasm consistent with recurrent vulvar cancer     1/22/2021 Imaging     PET/CT negative for metastatic disease     2/1/2021 Surgery    Repeat  excision due to positive margin from procedure 11/25/2020.  All pathology negative for dysplasia and recurrence.           The current medication list and allergy list were reviewed and reconciled.     Past Medical History, Past Surgical History, Social History, Family History have been reviewed and are without significant changes except as mentioned.      Review of Systems   Constitutional: Negative.    Gastrointestinal: Negative.    Genitourinary: Negative.          Objective   Physical Exam  Vital Signs: BP (!) 181/78   Pulse 66   Temp 97.5 °F (36.4 °C)   Resp 16   Wt 70.8 kg (156 lb)   SpO2 97%   BMI 25.18 kg/m²    Vitals:    10/06/22 0953   PainSc: 0-No pain           General Appearance:  alert, cooperative, no apparent distress, appears stated age and normal weight   Neurologic/Psychiatric: A&O x 3, gait steady, appropriate affect   HEENT:  Normocephalic, without obvious abnormality, mucous membranes moist   Back: No CVA tenderness   Abdomen:   Soft, non-tender, non-distended and no organomegaly   Lymph nodes: No cervical, supraclavicular, inguinal adenopathy noted   Pelvic: External Genitalia  remarkable for changes consistent with previous radiation, extensive vulvectomy, and repeat excisions, and generalized lichen sclerosus. No suspicious lesions or masses noted today.   Vagina  is pale, atrophic.  and changes consistent with previous radiation.  Vaginal Cuff  Female Vaginal Cuff: smooth, intact and without visible lesions  Uterus  surgically absent and no palpable masses  Ovaries  surgically absent bilaterally  Parametria  smooth                   Result Review :   Last imaging study was PET 1/22/2021.     Procedure Note:            Assessment and Plan:    Diagnoses and all orders for this visit:    1. History of female genital cancer (Primary)    2. Lichen sclerosus of female genitalia          There is no evidence of disease upon today's exam.  Patient reassured no abnormalities upon exam today.  She is approaching 2 years since completion of last surgery with positive pathology. She may go to every 6 month visits. She is due for annual exam with Dr. Funes in 12/2022, plan to return here 6 months after that. Continue barrier topical for skin protectant, clobetasol sparingly for lichens flares only. She is understanding to call with any changes in pelvic symptoms or general GYN concerns at any time between regularly scheduled visits.       Pain assessment was performed today as a part of patient’s care.  For patients with pain related to surgery, gynecologic malignancy or cancer treatment, the plan is as noted in the assessment/plan.  For patients with pain not related to these issues, they are to seek any further needed care from a more appropriate provider, such as PCP.      Follow-up:     Return to clinic in June 2023 for ongoing cancer surveillance or sooner PRN.      Electronically signed by JOHNNY Rodriguez on 10/06/22 at 10:13 EDT

## 2024-06-20 ENCOUNTER — OFFICE VISIT (OUTPATIENT)
Dept: GYNECOLOGIC ONCOLOGY | Facility: CLINIC | Age: 74
End: 2024-06-20
Payer: MEDICARE

## 2024-06-20 VITALS
SYSTOLIC BLOOD PRESSURE: 123 MMHG | DIASTOLIC BLOOD PRESSURE: 68 MMHG | OXYGEN SATURATION: 98 % | BODY MASS INDEX: 26.33 KG/M2 | RESPIRATION RATE: 18 BRPM | HEIGHT: 66 IN | WEIGHT: 163.8 LBS | TEMPERATURE: 97.2 F | HEART RATE: 68 BPM

## 2024-06-20 DIAGNOSIS — C51.9 VULVAR CANCER: Primary | ICD-10-CM

## 2024-06-20 DIAGNOSIS — N90.4 LICHEN SCLEROSUS OF FEMALE GENITALIA: ICD-10-CM

## 2024-06-20 PROCEDURE — 3078F DIAST BP <80 MM HG: CPT | Performed by: NURSE PRACTITIONER

## 2024-06-20 PROCEDURE — 1126F AMNT PAIN NOTED NONE PRSNT: CPT | Performed by: NURSE PRACTITIONER

## 2024-06-20 PROCEDURE — 1159F MED LIST DOCD IN RCRD: CPT | Performed by: NURSE PRACTITIONER

## 2024-06-20 PROCEDURE — 1160F RVW MEDS BY RX/DR IN RCRD: CPT | Performed by: NURSE PRACTITIONER

## 2024-06-20 PROCEDURE — 3074F SYST BP LT 130 MM HG: CPT | Performed by: NURSE PRACTITIONER

## 2024-06-20 PROCEDURE — 99213 OFFICE O/P EST LOW 20 MIN: CPT | Performed by: NURSE PRACTITIONER

## 2024-06-20 RX ORDER — OLMESARTAN MEDOXOMIL 40 MG/1
40 TABLET ORAL DAILY
COMMUNITY
Start: 2024-04-18

## 2024-06-20 RX ORDER — HYDROCHLOROTHIAZIDE 12.5 MG/1
12.5 CAPSULE, GELATIN COATED ORAL
COMMUNITY
Start: 2024-05-26

## 2024-06-20 NOTE — PROGRESS NOTES
GYN ONCOLOGY CANCER SURVEILLANCE FOLLOW-UP    Lee Ann Cullen  [id number]  1950    Subjective   Chief Complaint: Follow up, Vulvar Cancer       History of present illness:   Lee Ann Cullen is a 73 y.o. year old female who is here today for ongoing surveillance of Vulvar Cancer, see Cancer History. She will reach 4 years out from last surgery with positive pathology this fall (11/2024). Follow up excision in 2/2021 was negative with negative margins. She has been alternating visits between Gyn Oncology and Dr. Funes. Last completed annual exam with Dr Funes in 12/2023, scheduled for 2024 annual visit this winter.   She uses clobetasol, sparingly, for treatment of lichen sclerosus. States she is also using this in rectal area as she has LS there too. Denies needing RF.   At time of last visit here, she was referred to colorectal surgery for evaluation of diarrhea and anal itching/ burning. Pt met with Dr Miles in July 2023 for consultation and a completed colonoscopy in 8/2023. Per pt, a couple polyps were removed and reportedly normal.  Prior to that her last colonoscopy was com[pleted by Dr Soriano in 12/2018 with recommendations to repeat in 5 years. She reports persistent anal itching and occasional diarrhea which she attributes to certain dietary triggers. Denies any GYN complaints. She denies concerning lesions, vulvar itching/burning, vaginal bleeding, pelvic pain, and changes in bladder function.       Cancer History:   Oncology/Hematology History   Vulvar cancer   3/19/2014 Initial Diagnosis    Moderately differentiated squamous cell carcinoma identified upon biopsies of vulvar lesion and labia minora     4/28/2014 Surgery    Modified radical left vulvectomy and left inguinofemoral LND. Stage IIIB grade 1 by final pathology, 7/10 lymph nodes positive.     5/16/2014 Imaging    PET/CT negative for distant metastases     6/2/2014 - 7/9/2014 Radiation    Pelvis received 45 Gy in 25 fractions with 6 MV photons  "utilizing IMRT treatment planning     6/2014 - 7/2014 Chemotherapy    Completed concurrent chemotherapy with Cisplatin for radiosensitivity     7/10/2014 - 7/16/2014 Radiation    Tumor bed was boosted with additional 5.4 Gy in 3 fractions with 6 MV photons, for a total of 50.4 Gy     8/21/2014 Imaging    Post-treatment CT scan negative for disease     1/10/2019 Biopsy    Biopsy of new lesion benign, nonspecific ulceration     11/25/2020 Surgery    Wide local excision of right labia majora ([facility]):final pathology revealed a recurrent poorly differentiated neoplasm consistent with recurrent vulvar cancer     1/22/2021 Imaging     PET/CT negative for metastatic disease     2/1/2021 Surgery    Repeat excision due to positive margin from procedure 11/25/2020.  All pathology negative for dysplasia and recurrence.           The current medication list and allergy list were reviewed and reconciled.     Past Medical History, Past Surgical History, Social History, Family History have been reviewed and are without significant changes except as mentioned.      Review of Systems   Constitutional: Negative.    Gastrointestinal:  Positive for diarrhea (occasional diarrhea, associated with certain dietary choices). Negative for abdominal distention, abdominal pain, anal bleeding, blood in stool, constipation, nausea, rectal pain, vomiting and GERD.        +rectal itching, improves with clobetasol    Genitourinary: Negative.            Objective   Physical Exam  Genitourinary:     Rectum: No external hemorrhoid.           Vital Signs: /68   Pulse 68   Temp 97.2 °F (36.2 °C) (Temporal)   Resp 18   Ht 167.6 cm (66\")   Wt 74.3 kg (163 lb 12.8 oz)   SpO2 98%   BMI 26.44 kg/m²   Vitals:    06/20/24 1050   PainSc: 0-No pain           General Appearance:  alert, cooperative, no apparent distress and appears stated age   Neurologic/Psych: A&O x 3, gait steady, appropriate affect   HEENT:  Normocephalic, without obvious " abnormality, mucous membranes moist   Abdomen:   Soft, non-tender, non-distended, and no organomegaly   Lymph nodes: No cervical, supraclavicular, inguinal adenopathy noted   Pelvic: External Genitalia- remarkable for changes consistent with previous radiation, extensive vulvectomy, and repeat excisions, and generalized lichen sclerosus-- extending into perineal area. No suspicious vulvar lesions or masses noted.   Vagina  is pale, atrophic, and changes consistent with previous radiation.  Vaginal Cuff  Female Vaginal Cuff: smooth, intact, and without visible lesions  Uterus  surgically absent and no palpable masses  Ovaries  surgically absent bilaterally and without palpable masses or fullness  Rectovaginal  Female rectovaginal: deferred. External rectal exam      ECOG score: 0             Result Review :  -last gyn onc note  -reports from 2023 visit with Dr Miles at [facility]  -last colonoscopy report     PHQ-9 Total Score: 0    Procedure Note:            Assessment and Plan:      Diagnoses and all orders for this visit:    1. Vulvar cancer (Primary)    2. Lichen sclerosus of female genitalia    -I will request records from [facility] of recent colonoscopy performed by Dr. Miles   -There is no evidence of disease upon today's exam.  Patient reassured no abnormalities upon exam today. She is approaching 4 years since completion of last surgery with positive pathology.  She will continue every 6-month visits, alternating between GYN oncology office and Dr. Funes. Follow up with Dr Funes as scheduled and continue annual exams/ routine gyn care through him.  -Continue skin care recommendations and clobetasol for lichens as prev prescribed. Declines RF today. Call when needed.  -She is understanding to call with any changes in pelvic symptoms or general GYN concerns at any time between regularly scheduled visits.     Pain assessment was performed today as a part of patient’s care.  For patients with pain related to surgery,  gynecologic malignancy or cancer treatment, the plan is as noted in the assessment/plan.  For patients with pain not related to these issues, they are to seek any further needed care from a more appropriate provider, such as PCP.           Follow-up:    Return to clinic in 1 year for ongoing cancer surveillance.  F/u with Dr Ulises Funes in ~6m for annual exam      Electronically signed by JOHNNY Yao on 06/20/2024

## 2025-06-18 ENCOUNTER — TELEPHONE (OUTPATIENT)
Dept: GYNECOLOGIC ONCOLOGY | Facility: CLINIC | Age: 75
End: 2025-06-18

## 2025-06-18 NOTE — TELEPHONE ENCOUNTER
Caller: Lee Ann Cullen    Relationship to patient: Self    Best call back number: [phone]    Chief complaint: CONFLICT OF SCHEDULE    Type of visit: PAP SMEAR/PELVIC EXAM    Requested date: SOMETIME IN THE MIDDLE OF JULY  NEEDING MORNING APPT TIME     EXCEPT CANNOT DO ON 07/11 OR 07/28     If rescheduling, when is the original appointment: 06/23

## 2025-07-14 ENCOUNTER — OFFICE VISIT (OUTPATIENT)
Dept: GYNECOLOGIC ONCOLOGY | Facility: CLINIC | Age: 75
End: 2025-07-14
Payer: MEDICARE

## 2025-07-14 VITALS
HEART RATE: 71 BPM | SYSTOLIC BLOOD PRESSURE: 105 MMHG | TEMPERATURE: 97.1 F | HEIGHT: 66 IN | RESPIRATION RATE: 17 BRPM | DIASTOLIC BLOOD PRESSURE: 60 MMHG | OXYGEN SATURATION: 97 % | BODY MASS INDEX: 26.33 KG/M2 | WEIGHT: 163.8 LBS

## 2025-07-14 DIAGNOSIS — N90.4 LICHEN SCLEROSUS OF FEMALE GENITALIA: ICD-10-CM

## 2025-07-14 DIAGNOSIS — Z87.412 HISTORY OF VULVAR DYSPLASIA: Primary | ICD-10-CM

## 2025-07-14 NOTE — PROGRESS NOTES
GYN ONCOLOGY CANCER SURVEILLANCE FOLLOW-UP    Lee Ann Cullen  [id number]  1950    Subjective   Chief Complaint:   Follow up, Vulvar Cancer     History of present illness:   Lee Ann Cullen is a 74 y.o. year old female who is here today for ongoing surveillance of Vulvar Cancer, see Cancer History. She is now approaching 5 years out since completion of treatment this fall (11/2020) and doing well. Lee Ann most recently underwent a perianal biopsy x1 and vulvar biopsy x2 on 2-1-2021. Pathology from anus showed benign squamous papilloma with surface reactive change.  Negative for dysplasia or neoplasm.  Vulvar lesion #1, wide excision-- path: Central area of inflammation and foreign body reaction, no residual neoplasm identified.  Margins of excision free of neoplasm. Vulvar lesion #2, medial, wide excision-- path: Central area of inflammation and scarring with background of hypertrophic lichen sclerosis with squamous hyperplasia without dysplasia.  Negative for high-grade dysplasia and neoplasm.  Margins of excision free.  She has been alternating visits between GYN oncology and Dr. Funes.      She has no acute complaints or concerns at this time.  Denies any major changes in health since last visit. She does endorse ongoing vulvar irritation and rectal itching (see below). She denies vaginal bleeding and discharge. She does not have abdominal or pelvic pain. She is tolerating a regular diet and endorses a normal appetite. She denies nausea and vomiting. She denies early satiety and bloating. Denies any CP, SOB, lightheadedness or dizziness. She denies changes in her bowel/bladder. No dysuria, frequency, urgency, hematuria or flank pain. Reports normal energy levels.       Last colonoscopy by Dr Miles on 8-8-2023 which was a consult for evaluation of diarrhea and anal itching/ burning.  Per pt, a couple polyps were removed and reportedly normal.  Pathology report in EMR notes: 1 polyp: Benign, no pathologic change,  negative for colitis dysplasia and malignancy. Ascending colon polyp: Portions of tubular adenoma, negative for high-grade dysplasia and malignancy.  I do not see Dr. Miles's recommendation for when/if to repeat colonoscopy again. Prior to that her last colonoscopy was completed by Dr Soriano in 12/2018 with recommendation to repeat in 5 years. Today she updates that someone (she cannot recall who/ what office) did another colonoscopy in 8/2024. She again today c/o ongoing anal itching and discomfort/irritation of clay area. She does not have a dermatologist. Pt has been diagnosed with Lichens Sclerosis and has Rx for clobetasol 0.05% cream on file as current med-- last sent by this office in 2017.  Today I confirmed that she does still take this medication and has been getting refills through PCP.  Reports consistent, compliant use twice daily in the vulvar and anal region.  Symptoms are unchanged but persistent, the cream does help some.      Cancer History:   Oncology/Hematology History   Vulvar cancer   3/19/2014 Initial Diagnosis    Moderately differentiated squamous cell carcinoma identified upon biopsies of vulvar lesion and labia minora     4/28/2014 Surgery    Modified radical left vulvectomy and left inguinofemoral LND. Stage IIIB grade 1 by final pathology, 7/10 lymph nodes positive.     5/16/2014 Imaging    PET/CT negative for distant metastases     6/2/2014 - 7/9/2014 Radiation    Pelvis received 45 Gy in 25 fractions with 6 MV photons utilizing IMRT treatment planning     6/2014 - 7/2014 Chemotherapy    Completed concurrent chemotherapy with Cisplatin for radiosensitivity     7/10/2014 - 7/16/2014 Radiation    Tumor bed was boosted with additional 5.4 Gy in 3 fractions with 6 MV photons, for a total of 50.4 Gy     8/21/2014 Imaging    Post-treatment CT scan negative for disease     1/10/2019 Biopsy    Biopsy of new lesion benign, nonspecific ulceration     11/25/2020 Surgery    Wide local excision of  "right labia majora ([facility]):final pathology revealed a recurrent poorly differentiated neoplasm consistent with recurrent vulvar cancer     1/22/2021 Imaging     PET/CT negative for metastatic disease     2/1/2021 Surgery    Repeat excision due to positive margin from procedure 11/25/2020.  All pathology negative for dysplasia and recurrence.           The current medication list and allergy list were reviewed and reconciled.     Past Medical History, Past Surgical History, Social History, Family History have been reviewed and are without significant changes except as mentioned.      Review of Systems   Constitutional: Negative.    Gastrointestinal: Negative.         +anal itching, otherwise negative ROS   Genitourinary: Negative.    Skin:         +c/o vulvar irritation and itching which she attributes to LS   Hematological: Negative.    Psychiatric/Behavioral: Negative.             Objective   Physical Exam  Vital Signs: /60   Pulse 71   Temp 97.1 °F (36.2 °C) (Temporal)   Resp 17   Ht 167.6 cm (65.98\")   Wt 74.3 kg (163 lb 12.8 oz)   SpO2 97%   BMI 26.45 kg/m²   Vitals:    07/14/25 0939   PainSc: 0-No pain           General Appearance:  alert, cooperative, no apparent distress and appears stated age   Neurologic/Psych: A&O x 3, gait steady, appropriate affect   HEENT:  Normocephalic, without obvious abnormality, mucous membranes moist   Abdomen:   Soft, non-tender, non-distended, and no organomegaly   Lymph nodes: No cervical, supraclavicular, inguinal adenopathy noted   Pelvic: External Genitalia- atrophic, remarkable for changes consistent with previous radiation, extensive vulvectomy, and repeat excisions, and generalized lichen sclerosus-- red and white discoloration of skin, figure 8 pattern extending above clitoral gallardo into perineal area. No suspicious vulvar lesions or masses noted.   Vagina  is pale, atrophic, and changes consistent with previous radiation.  Vaginal Cuff  Female Vaginal Cuff: " smooth, intact, and without visible lesions  Uterus  surgically absent and no palpable masses  Ovaries  surgically absent bilaterally and without palpable masses or fullness  Rectovaginal  Female rectovaginal: confirms no masses or bleeding and Hemoccult negative     ECOG score: 0                     Assessment and Plan:     -Lee Ann Cullen is a 74 y.o. female who has a history of a grade 1 Stage IIIB vulvar cancer. She was originally a patient of Dr Kirkpatrick, then subsequently Dr Mccauley following his [facility]. See detailed ca hx noted above. In 11/2025 she will reach 5 years since completing tx. This was last surgery with positive pathology.     -She is currently without evidence of disease. Signs of recurrent disease, such as vulvar or groin lesions, vulvar/vaginal irritation, pruritus, or bleeding, urinary or bowel changes, and shortness of breath were reviewed with the patient. She was advised to follow up immediately if she develops any of the above symptoms. She was also reminded to maintain a healthy lifestyle with a well balanced diet, calcium and vitamin D for osteoporosis prevention, and exercise as well as continue with recommended health and cancer screening guidelines. She will continue alternating visits between here and general gyn (Dr Ulises Funes) every 6 months until at least reaching 5 yr jannet. I anticipate graduating her from this office upon return.   -She will follow-up in 1 year. Still f/u with Dr Funes in 6m.     - For now, continue topical clobetasol as prescribed.  Her colonoscopy is UTD and she has been evaluated by colorectal surgery specialist for anal itching.  Previous recent biopsies in this office of both vulva and anus (see hpi) showed neg path. Given severity of LS and symptoms and the fact that she will soon potentially be d/c'd from our service I recommended she consider consultation with dermatology for treatment recommendations of LS. She is agreeable to this.     Diagnoses  and all orders for this visit:    1. History of vulvar dysplasia (Primary)    2. Lichen sclerosus of female genitalia  -     Ambulatory Referral to Dermatology    Today I have spent a total of 35 minutes caring for Lee Ann Cullen.  This includes time spent preparing for the visit, reviewing tests, performing a medically appropriate examination and/or evaluation , counseling and educating the patient/family/caregiver, ordering medications, tests, or procedures and documenting information in the medical record.   Pain assessment was performed today as a part of patient’s care.  For patients with pain related to surgery, gynecologic malignancy or cancer treatment, the plan is as noted in the assessment/plan.  For patients with pain not related to these issues, they are to seek any further needed care from a more appropriate provider, such as PCP.    Follow-up:     Return to clinic in 1 year for ongoing cancer surveillance (bottom only, no pap).      Electronically signed by JOHNNY Yao on 07/14/2025

## (undated) DEVICE — PENCL E/S ULTRAVAC TELESCP NOSE HOLSTR 10FT

## (undated) DEVICE — IRRIGATOR BULB ASEPTO 60CC STRL

## (undated) DEVICE — TUBING, SUCTION, 1/4" X 10', STRAIGHT: Brand: MEDLINE

## (undated) DEVICE — SHEET, DRAPE, SPLIT, STERILE: Brand: MEDLINE

## (undated) DEVICE — GLV SURG SENSICARE PI MIC PF SZ6 LF STRL

## (undated) DEVICE — MEDI-VAC YANKAUER SUCTION HANDLE W/BULBOUS TIP: Brand: CARDINAL HEALTH

## (undated) DEVICE — LEGGINGS, PAIR, 29X43, STERILE: Brand: MEDLINE

## (undated) DEVICE — TRAP FLD MINIVAC MEGADYNE 100ML

## (undated) DEVICE — PACK,COLD,STANDARD,OB-PAD,4.5X14.25: Brand: MEDLINE

## (undated) DEVICE — LEX VAGINAL HYSTERECTOMY: Brand: MEDLINE INDUSTRIES, INC.

## (undated) DEVICE — NDL HYPO ECLPS SFTY 22G 1 1/2IN

## (undated) DEVICE — INTENDED FOR TISSUE SEPARATION, AND OTHER PROCEDURES THAT REQUIRE A SHARP SURGICAL BLADE TO PUNCTURE OR CUT.: Brand: BARD-PARKER ® STAINLESS STEEL BLADES

## (undated) DEVICE — UNDERGLV SURG BIOGEL INDICAT PF 61/2 GRN

## (undated) DEVICE — PANTY PRE/OP MODEST 1/SZ BLK DISP

## (undated) DEVICE — DRSNG TELFA PAD NONADH STR 1S 3X8IN